# Patient Record
Sex: FEMALE | Race: BLACK OR AFRICAN AMERICAN | NOT HISPANIC OR LATINO | Employment: OTHER | URBAN - METROPOLITAN AREA
[De-identification: names, ages, dates, MRNs, and addresses within clinical notes are randomized per-mention and may not be internally consistent; named-entity substitution may affect disease eponyms.]

---

## 2017-03-26 ENCOUNTER — HOSPITAL ENCOUNTER (EMERGENCY)
Facility: CLINIC | Age: 72
Discharge: HOME | End: 2017-03-26
Attending: EMERGENCY MEDICINE | Admitting: EMERGENCY MEDICINE
Payer: MEDICARE

## 2017-03-26 VITALS
BODY MASS INDEX: 41.84 KG/M2 | OXYGEN SATURATION: 98 % | RESPIRATION RATE: 16 BRPM | SYSTOLIC BLOOD PRESSURE: 125 MMHG | WEIGHT: 251.4 LBS | TEMPERATURE: 98.1 F | DIASTOLIC BLOOD PRESSURE: 70 MMHG

## 2017-03-26 DIAGNOSIS — J11.1 FLU: ICD-10-CM

## 2017-03-26 DIAGNOSIS — J11.1 INFLUENZA-LIKE ILLNESS: ICD-10-CM

## 2017-03-26 LAB
FLUAV+FLUBV AG SPEC QL: NEGATIVE
FLUAV+FLUBV AG SPEC QL: NORMAL
SPECIMEN SOURCE: NORMAL

## 2017-03-26 PROCEDURE — 87804 INFLUENZA ASSAY W/OPTIC: CPT | Mod: 91 | Performed by: EMERGENCY MEDICINE

## 2017-03-26 PROCEDURE — 99283 EMERGENCY DEPT VISIT LOW MDM: CPT | Performed by: EMERGENCY MEDICINE

## 2017-03-26 PROCEDURE — 99283 EMERGENCY DEPT VISIT LOW MDM: CPT | Mod: Z6 | Performed by: EMERGENCY MEDICINE

## 2017-03-26 RX ORDER — OSELTAMIVIR PHOSPHATE 75 MG/1
75 CAPSULE ORAL 2 TIMES DAILY
Qty: 10 CAPSULE | Refills: 0 | Status: SHIPPED | OUTPATIENT
Start: 2017-03-26 | End: 2017-03-31

## 2017-03-26 NOTE — DISCHARGE INSTRUCTIONS
"Please make an appointment to follow up with Your Primary Care Provider as soon as possible if not improving.      Viral Syndrome (Adult)  A viral illness may cause a number of symptoms. The symptoms depend on the part of the body that the virus affects. If it settles in the nose, throat, and lungs, it may cause cough, sore throat, congestion, and sometimes headache. If it settles in the stomach and intestinal tract, it may cause vomiting and diarrhea. Sometimes it causes vague symptoms like \"aching all over,\" feeling tired, loss of appetite, or fever.  A viral illness usually lasts 1 to 2 weeks, but sometimes it lasts longer. In some cases, a more serious infection can look like a viral syndrome in the first few days of the illness. You may need another exam and additional tests to know the difference. Watch for the warning signs listed below.  Home care  Follow these guidelines for taking care of yourself at home:    If symptoms are severe, rest at home for the first 2 to 3 days.    Stay away from cigarette smoke - both your smoke and the smoke from others.    You may use over-the-counter medication acetaminophen or ibuprofen for fever, muscle aching, and headache, unless another medicine was prescribed for this. If you have chronic liver or kidney disease or ever had a stomach ulcer or GI bleeding, talk with your doctor before using these medicines . No one who is younger than 18 and ill with a fever should take aspirin. It may cause severe disease or death liver damage .    Your appetite may be poor, so a light diet is fine. Avoid dehydration by drinking 8 to 12 8-ounce glasses of fluids each day. This may include water; orange juice; lemonade; apple, grape, and cranberry juice; clear fruit drinks; electrolyte replacement and sports drinks; and decaffeinated teas and coffee. If you have been diagnosed with a kidney disease, ask your doctor how much and what types of fluids you should drink to prevent " dehydration. If you have kidney disease, drinking too much fluid can cause it build up in the your body and be dangerous to your health.    Over-the-counter remedies won't shorten the length of the illness but may be helpful for cough, sore throat; and nasal and sinus congestion. Don't use decongestants if you have high blood pressure.  Follow-up care  Follow up with your health care provider if you do not improve over the next week.  When to seek medical advice  Call your healthcare provider right away if any of these occur:    Cough with lots of colored sputum (mucus) or blood in your sputum    Chest pain, shortness of breath, wheezing, or difficulty breathing    Severe headache; face, neck, or ear pain    Severe, constant pain in the lower right side of your belly (abdominal)    Continued vomiting (can t keep liquids down)    Frequent diarrhea (more than 5 times a day); blood (red or black color) or mucus in diarrhea    Feeling weak, dizzy, or like you are going to faint    Extreme thirst    Fever of 100.4 F (38 C) or higher, or as directed by your healthcare provider  Call 911  Get emergency medical care if any of the following occur:    Convulsion    Feeling weak, dizzy, or like you are going to faint    Chest pain, shortness of breath, wheezing, or difficulty breathing    4521-7993 [employer]. [address] [place] [address], [place] [address]. All rights reserved. This information is not intended as a substitute for professional medical care. Always follow your healthcare professional's instructions.

## 2017-03-26 NOTE — ED AVS SNAPSHOT
" [facility], Emergency Department    [address]    [place] [address]    Phone:  [phone]    Fax:  [phone]                                       Shira Marsh   MRN: [id number]    Department:  [facility], Emergency Department   Date of Visit:  3/26/2017           Patient Information     Date Of Birth          1945        Your diagnoses for this visit were:     Influenza-like illness        You were seen by Henry Hughes MD.        Discharge Instructions       Please make an appointment to follow up with Your Primary Care Provider as soon as possible if not improving.      Viral Syndrome (Adult)  A viral illness may cause a number of symptoms. The symptoms depend on the part of the body that the virus affects. If it settles in the nose, throat, and lungs, it may cause cough, sore throat, congestion, and sometimes headache. If it settles in the stomach and intestinal tract, it may cause vomiting and diarrhea. Sometimes it causes vague symptoms like \"aching all over,\" feeling tired, loss of appetite, or fever.  A viral illness usually lasts 1 to 2 weeks, but sometimes it lasts longer. In some cases, a more serious infection can look like a viral syndrome in the first few days of the illness. You may need another exam and additional tests to know the difference. Watch for the warning signs listed below.  Home care  Follow these guidelines for taking care of yourself at home:    If symptoms are severe, rest at home for the first 2 to 3 days.    Stay away from cigarette smoke - both your smoke and the smoke from others.    You may use over-the-counter medication acetaminophen or ibuprofen for fever, muscle aching, and headache, unless another medicine was prescribed for this. If you have chronic liver or kidney disease or ever had a stomach ulcer or GI bleeding, talk with your doctor before using these medicines . No one who is younger than 18 and ill with a fever should take " aspirin. It may cause severe disease or death liver damage .    Your appetite may be poor, so a light diet is fine. Avoid dehydration by drinking 8 to 12 8-ounce glasses of fluids each day. This may include water; orange juice; lemonade; apple, grape, and cranberry juice; clear fruit drinks; electrolyte replacement and sports drinks; and decaffeinated teas and coffee. If you have been diagnosed with a kidney disease, ask your doctor how much and what types of fluids you should drink to prevent dehydration. If you have kidney disease, drinking too much fluid can cause it build up in the your body and be dangerous to your health.    Over-the-counter remedies won't shorten the length of the illness but may be helpful for cough, sore throat; and nasal and sinus congestion. Don't use decongestants if you have high blood pressure.  Follow-up care  Follow up with your health care provider if you do not improve over the next week.  When to seek medical advice  Call your healthcare provider right away if any of these occur:    Cough with lots of colored sputum (mucus) or blood in your sputum    Chest pain, shortness of breath, wheezing, or difficulty breathing    Severe headache; face, neck, or ear pain    Severe, constant pain in the lower right side of your belly (abdominal)    Continued vomiting (can t keep liquids down)    Frequent diarrhea (more than 5 times a day); blood (red or black color) or mucus in diarrhea    Feeling weak, dizzy, or like you are going to faint    Extreme thirst    Fever of 100.4 F (38 C) or higher, or as directed by your healthcare provider  Call 911  Get emergency medical care if any of the following occur:    Convulsion    Feeling weak, dizzy, or like you are going to faint    Chest pain, shortness of breath, wheezing, or difficulty breathing    6588-8574 [employer]. [address] [place] [address], [place] [address]. All rights reserved. This information is not intended as a substitute  for professional medical care. Always follow your healthcare professional's instructions.            24 Hour Appointment Hotline       To make an appointment at any [facility], call [phone] ([phone]). If you don't have a family doctor or clinic, we will help you find one. [facility] clinics are conveniently located to serve the needs of you and your family.             Review of your medicines      START taking        Dose / Directions Last dose taken    oseltamivir 75 MG capsule   Commonly known as:  TAMIFLU   Dose:  75 mg   Quantity:  10 capsule        Take 1 capsule (75 mg) by mouth 2 times daily for 5 days   Refills:  0          Our records show that you are taking the medicines listed below. If these are incorrect, please call your family doctor or clinic.        Dose / Directions Last dose taken    acetaminophen 325 MG tablet   Commonly known as:  TYLENOL   Dose:  650 mg   Quantity:  40 tablet        Take 2 tablets (650 mg) by mouth every 4 hours as needed for mild pain   Refills:  0        hydrochlorothiazide 12.5 MG Tabs tablet   Dose:  12.5 mg   Quantity:  14 tablet        Take 1 tablet (12.5 mg) by mouth daily   Refills:  0        prochlorperazine 10 MG tablet   Commonly known as:  COMPAZINE   Dose:  10 mg   Quantity:  10 tablet        Take 1 tablet (10 mg) by mouth every 6 hours as needed for nausea or vomiting   Refills:  0                Prescriptions were sent or printed at these locations (1 Prescription)                   Other Prescriptions                Printed at Department/Unit printer (1 of 1)         oseltamivir (TAMIFLU) 75 MG capsule                Procedures and tests performed during your visit     Influenza A/B antigen swab      Orders Needing Specimen Collection     None      Pending Results     No orders found from 3/24/2017 to 3/27/2017.            Pending Culture Results     No orders found from 3/24/2017 to 3/27/2017.            Thank you for choosing [facility]      "  Thank you for choosing [facility] for your care. Our goal is always to provide you with excellent care. Hearing back from our patients is one way we can continue to improve our services. Please take a few minutes to complete the written survey that you may receive in the mail after you visit with us. Thank you!        [employer] Information     [employer] lets you send messages to your doctor, view your test results, renew your prescriptions, schedule appointments and more. To sign up, go to [email or web address] . Click on \"Log in\" on the left side of the screen, which will take you to the Welcome page. Then click on \"Sign up Now\" on the right side of the page.     You will be asked to enter the access code listed below, as well as some personal information. Please follow the directions to create your username and password.     Your access code is: [email or web address]  Expires: 2017  5:05 AM     Your access code will  in 90 days. If you need help or a new code, please call your [facility] clinic or [phone].        Care EveryWhere ID     This is your Care EveryWhere ID. This could be used by other organizations to access your [facility] medical records  [id number]        After Visit Summary       This is your record. Keep this with you and show to your community pharmacist(s) and doctor(s) at your next visit.                  "

## 2017-03-26 NOTE — ED AVS SNAPSHOT
[facility], [place], Emergency Department    [address]    [facility] [address]    Phone:  [phone]    Fax:  [phone]                                       Shira Marsh   MRN: [id number]    Department:  [facility], Emergency Department   Date of Visit:  3/26/2017           After Visit Summary Signature Page     I have received my discharge instructions, and my questions have been answered. I have discussed any challenges I see with this plan with the nurse or doctor.    ..........................................................................................................................................  Patient/Patient Representative Signature      ..........................................................................................................................................  Patient Representative Print Name and Relationship to Patient    ..................................................               ................................................  Date                                            Time    ..........................................................................................................................................  Reviewed by Signature/Title    ...................................................              ..............................................  Date                                                            Time

## 2017-03-27 NOTE — ED PROVIDER NOTES
History     Chief Complaint   Patient presents with     Cold Symptoms     x 1 week     HPI  Shira Marsh is a 71 year old female who presents with nasal congestion for the last 2 days.  Nursing notes she was sick for the last week, but she clarifies it was for the last 2 days.  She has a sick grandson with croup.  She denies any fever or chills.  She has no pain or shortness of breath.   She denies any cough. She has no vomiting or diarrhea.  She has no recent travel.  She reports a mild headache and body aches.      PAST MEDICAL HISTORY:   Past Medical History:   Diagnosis Date     Degenerative disc disease      Depressive disorder      Enlarged heart      Hypertension        PAST SURGICAL HISTORY:   Past Surgical History:   Procedure Laterality Date     APPENDECTOMY       GYN SURGERY      Hysterectomy     GYN SURGERY      Tubal ligation       FAMILY HISTORY: No family history on file.    SOCIAL HISTORY:   Social History   Substance Use Topics     Smoking status: Never Smoker     Smokeless tobacco: Not on file     Alcohol use No       Discharge Medication List as of 3/26/2017  5:06 AM      START taking these medications    Details   oseltamivir (TAMIFLU) 75 MG capsule Take 1 capsule (75 mg) by mouth 2 times daily for 5 days, Disp-10 capsule, R-0, Local Print         CONTINUE these medications which have NOT CHANGED    Details   prochlorperazine (COMPAZINE) 10 MG tablet Take 1 tablet (10 mg) by mouth every 6 hours as needed for nausea or vomiting, Disp-10 tablet, R-0, Local Print      acetaminophen (TYLENOL) 325 MG tablet Take 2 tablets (650 mg) by mouth every 4 hours as needed for mild pain, Disp-40 tablet, R-0, Local Print      hydrochlorothiazide 12.5 MG TABS Take 1 tablet (12.5 mg) by mouth daily, Disp-14 tablet, R-0, Local Print                Allergies   Allergen Reactions     Talwin [Pentazocine Lactate]      Hallucinations         I have reviewed the Medications, Allergies, Past Medical and Surgical  History, and Social History in the Epic system.    Review of Systems   10 pt ROS completed and negative except as noted above in HPI      Physical Exam   BP: (!) 170/93  Heart Rate: 93  Temp: 98.6  F (37  C)  Resp: 20  Weight: 114 kg (251 lb 6.4 oz)  SpO2: 93 %  Physical Exam   Constitutional: She is oriented to person, place, and time. No distress.   HENT:   Head: Normocephalic and atraumatic.   Right Ear: External ear normal.   Left Ear: External ear normal.   Mouth/Throat: Oropharynx is clear and moist. No oropharyngeal exudate.   Eyes: Conjunctivae are normal. Pupils are equal, round, and reactive to light.   Neck: Normal range of motion. Neck supple.   Cardiovascular: Normal rate and intact distal pulses.    Pulmonary/Chest: Effort normal. No respiratory distress. She has no wheezes. She has no rales. She exhibits no tenderness.   Abdominal: There is no tenderness. There is no rebound and no guarding.   Musculoskeletal: Normal range of motion. She exhibits no edema.   Neurological: She is alert and oriented to person, place, and time. She exhibits normal muscle tone.   Skin: Skin is warm and dry. No rash noted. She is not diaphoretic.   Nursing note and vitals reviewed.      ED Course     ED Course     Procedures             Critical Care time:  none               Labs Ordered and Resulted from Time of ED Arrival Up to the Time of Departure from the ED   INFLUENZA A/B ANTIGEN       Assessments & Plan (with Medical Decision Making)   1.  Influenza like illness    70 yo F with congestion, headache and body aches.  Influenza negative.  Patient non-toxic appearing and no evidence of SBI.  Symptoms consistent with influenza like illness.  Will treat with a course of tamiflu and have her follow up with her primary doctor if not improving.      I have reviewed the nursing notes.    I have reviewed the findings, diagnosis, plan and need for follow up with the patient.    Discharge Medication List as of 3/26/2017  5:06  AM      START taking these medications    Details   oseltamivir (TAMIFLU) 75 MG capsule Take 1 capsule (75 mg) by mouth 2 times daily for 5 days, Disp-10 capsule, R-0, Local Print             Final diagnoses:   Influenza-like illness       3/26/2017   [facility], [place], EMERGENCY DEPARTMENT     Henry Hughes MD  03/27/17 7267

## 2020-07-12 ENCOUNTER — APPOINTMENT (OUTPATIENT)
Dept: MRI IMAGING | Facility: CLINIC | Age: 75
DRG: 305 | End: 2020-07-12
Attending: EMERGENCY MEDICINE
Payer: COMMERCIAL

## 2020-07-12 ENCOUNTER — APPOINTMENT (OUTPATIENT)
Dept: CT IMAGING | Facility: CLINIC | Age: 75
DRG: 305 | End: 2020-07-12
Attending: EMERGENCY MEDICINE
Payer: COMMERCIAL

## 2020-07-12 ENCOUNTER — HOSPITAL ENCOUNTER (INPATIENT)
Facility: CLINIC | Age: 75
LOS: 4 days | Discharge: HOME | DRG: 305 | End: 2020-07-16
Attending: EMERGENCY MEDICINE | Admitting: INTERNAL MEDICINE
Payer: COMMERCIAL

## 2020-07-12 ENCOUNTER — APPOINTMENT (OUTPATIENT)
Dept: GENERAL RADIOLOGY | Facility: CLINIC | Age: 75
DRG: 305 | End: 2020-07-12
Attending: EMERGENCY MEDICINE
Payer: COMMERCIAL

## 2020-07-12 DIAGNOSIS — G52.9 CRANIAL NERVE PALSY: ICD-10-CM

## 2020-07-12 DIAGNOSIS — I63.81 CEREBROVASCULAR ACCIDENT (CVA) DUE TO STENOSIS OF SMALL ARTERY (H): Primary | ICD-10-CM

## 2020-07-12 DIAGNOSIS — I16.1 HYPERTENSIVE EMERGENCY: ICD-10-CM

## 2020-07-12 DIAGNOSIS — M25.569 PAIN IN JOINT, LOWER LEG: ICD-10-CM

## 2020-07-12 DIAGNOSIS — I16.0 HYPERTENSIVE URGENCY: ICD-10-CM

## 2020-07-12 DIAGNOSIS — R93.0 ABNORMAL CT OF THE HEAD: ICD-10-CM

## 2020-07-12 LAB
ALBUMIN SERPL-MCNC: 3.4 G/DL (ref 3.4–5)
ALBUMIN UR-MCNC: NEGATIVE MG/DL
ALP SERPL-CCNC: 81 U/L (ref 40–150)
ALT SERPL W P-5'-P-CCNC: 12 U/L (ref 0–50)
ANION GAP SERPL CALCULATED.3IONS-SCNC: 3 MMOL/L (ref 3–14)
APPEARANCE UR: ABNORMAL
APTT PPP: 30 SEC (ref 22–37)
AST SERPL W P-5'-P-CCNC: 6 U/L (ref 0–45)
BASOPHILS # BLD AUTO: 0.1 10E9/L (ref 0–0.2)
BASOPHILS NFR BLD AUTO: 1.8 %
BILIRUB SERPL-MCNC: 0.4 MG/DL (ref 0.2–1.3)
BILIRUB UR QL STRIP: NEGATIVE
BUN SERPL-MCNC: 10 MG/DL (ref 7–30)
CALCIUM SERPL-MCNC: 8.6 MG/DL (ref 8.5–10.1)
CHLORIDE SERPL-SCNC: 110 MMOL/L (ref 94–109)
CHOLEST SERPL-MCNC: 145 MG/DL
CO2 SERPL-SCNC: 29 MMOL/L (ref 20–32)
COLOR UR AUTO: ABNORMAL
CREAT BLD-MCNC: 0.8 MG/DL (ref 0.52–1.04)
CREAT SERPL-MCNC: 0.78 MG/DL (ref 0.52–1.04)
DIFFERENTIAL METHOD BLD: ABNORMAL
EOSINOPHIL # BLD AUTO: 0 10E9/L (ref 0–0.7)
EOSINOPHIL NFR BLD AUTO: 0 %
ERYTHROCYTE [DISTWIDTH] IN BLOOD BY AUTOMATED COUNT: 14.6 % (ref 10–15)
GFR SERPL CREATININE-BSD FRML MDRD: 70 ML/MIN/{1.73_M2}
GFR SERPL CREATININE-BSD FRML MDRD: 76 ML/MIN/{1.73_M2}
GLUCOSE BLDC GLUCOMTR-MCNC: 114 MG/DL (ref 70–99)
GLUCOSE SERPL-MCNC: 106 MG/DL (ref 70–99)
GLUCOSE UR STRIP-MCNC: NEGATIVE MG/DL
HBA1C MFR BLD: 5.9 % (ref 0–5.6)
HCT VFR BLD AUTO: 39.9 % (ref 35–47)
HDLC SERPL-MCNC: 50 MG/DL
HGB BLD-MCNC: 12 G/DL (ref 11.7–15.7)
HGB UR QL STRIP: NEGATIVE
INR PPP: 1.1 (ref 0.86–1.14)
INTERPRETATION ECG - MUSE: NORMAL
KETONES UR STRIP-MCNC: NEGATIVE MG/DL
LACTATE BLD-SCNC: 0.9 MMOL/L (ref 0.7–2)
LDLC SERPL CALC-MCNC: 84 MG/DL
LEUKOCYTE ESTERASE UR QL STRIP: ABNORMAL
LIPASE SERPL-CCNC: 44 U/L (ref 73–393)
LYMPHOCYTES # BLD AUTO: 1.3 10E9/L (ref 0.8–5.3)
LYMPHOCYTES NFR BLD AUTO: 28.6 %
MCH RBC QN AUTO: 26.8 PG (ref 26.5–33)
MCHC RBC AUTO-ENTMCNC: 30.1 G/DL (ref 31.5–36.5)
MCV RBC AUTO: 89 FL (ref 78–100)
MONOCYTES # BLD AUTO: 0.1 10E9/L (ref 0–1.3)
MONOCYTES NFR BLD AUTO: 1.8 %
MUCOUS THREADS #/AREA URNS LPF: PRESENT /LPF
MYELOCYTES # BLD: 0 10E9/L
MYELOCYTES NFR BLD MANUAL: 0.9 %
NEUTROPHILS # BLD AUTO: 3.1 10E9/L (ref 1.6–8.3)
NEUTROPHILS NFR BLD AUTO: 66.9 %
NITRATE UR QL: NEGATIVE
NONHDLC SERPL-MCNC: 95 MG/DL
NT-PROBNP SERPL-MCNC: 135 PG/ML (ref 0–900)
PH UR STRIP: 8 PH (ref 5–7)
PLATELET # BLD AUTO: 121 10E9/L (ref 150–450)
PLATELET # BLD EST: ABNORMAL 10*3/UL
POTASSIUM SERPL-SCNC: 3.6 MMOL/L (ref 3.4–5.3)
PROT SERPL-MCNC: 6.9 G/DL (ref 6.8–8.8)
RADIOLOGIST FLAGS: ABNORMAL
RBC # BLD AUTO: 4.47 10E12/L (ref 3.8–5.2)
RBC #/AREA URNS AUTO: 0 /HPF (ref 0–2)
RBC MORPH BLD: NORMAL
SODIUM SERPL-SCNC: 141 MMOL/L (ref 133–144)
SOURCE: ABNORMAL
SP GR UR STRIP: 1.03 (ref 1–1.03)
SQUAMOUS #/AREA URNS AUTO: 5 /HPF (ref 0–1)
T4 FREE SERPL-MCNC: 0.94 NG/DL (ref 0.76–1.46)
TRIGL SERPL-MCNC: 51 MG/DL
TROPONIN I SERPL-MCNC: <0.015 UG/L (ref 0–0.04)
TSH SERPL DL<=0.005 MIU/L-ACNC: 4.71 MU/L (ref 0.4–4)
UROBILINOGEN UR STRIP-MCNC: NORMAL MG/DL (ref 0–2)
WBC # BLD AUTO: 4.6 10E9/L (ref 4–11)
WBC #/AREA URNS AUTO: 2 /HPF (ref 0–5)

## 2020-07-12 PROCEDURE — 85025 COMPLETE CBC W/AUTO DIFF WBC: CPT | Performed by: EMERGENCY MEDICINE

## 2020-07-12 PROCEDURE — 80061 LIPID PANEL: CPT | Performed by: EMERGENCY MEDICINE

## 2020-07-12 PROCEDURE — 36415 COLL VENOUS BLD VENIPUNCTURE: CPT | Performed by: INTERNAL MEDICINE

## 2020-07-12 PROCEDURE — 70553 MRI BRAIN STEM W/O & W/DYE: CPT

## 2020-07-12 PROCEDURE — 83880 ASSAY OF NATRIURETIC PEPTIDE: CPT | Performed by: EMERGENCY MEDICINE

## 2020-07-12 PROCEDURE — 84439 ASSAY OF FREE THYROXINE: CPT | Performed by: EMERGENCY MEDICINE

## 2020-07-12 PROCEDURE — 70498 CT ANGIOGRAPHY NECK: CPT

## 2020-07-12 PROCEDURE — 12000004 ZZH R&B IMCU UMMC

## 2020-07-12 PROCEDURE — A9585 GADOBUTROL INJECTION: HCPCS | Performed by: RADIOLOGY

## 2020-07-12 PROCEDURE — 93005 ELECTROCARDIOGRAM TRACING: CPT | Performed by: EMERGENCY MEDICINE

## 2020-07-12 PROCEDURE — 99285 EMERGENCY DEPT VISIT HI MDM: CPT | Mod: 25 | Performed by: EMERGENCY MEDICINE

## 2020-07-12 PROCEDURE — 96365 THER/PROPH/DIAG IV INF INIT: CPT | Mod: 59 | Performed by: EMERGENCY MEDICINE

## 2020-07-12 PROCEDURE — 84484 ASSAY OF TROPONIN QUANT: CPT | Performed by: EMERGENCY MEDICINE

## 2020-07-12 PROCEDURE — 00000146 ZZHCL STATISTIC GLUCOSE BY METER IP

## 2020-07-12 PROCEDURE — U0003 INFECTIOUS AGENT DETECTION BY NUCLEIC ACID (DNA OR RNA); SEVERE ACUTE RESPIRATORY SYNDROME CORONAVIRUS 2 (SARS-COV-2) (CORONAVIRUS DISEASE [COVID-19]), AMPLIFIED PROBE TECHNIQUE, MAKING USE OF HIGH THROUGHPUT TECHNOLOGIES AS DESCRIBED BY CMS-2020-01-R: HCPCS | Performed by: INTERNAL MEDICINE

## 2020-07-12 PROCEDURE — 25800030 ZZH RX IP 258 OP 636: Performed by: EMERGENCY MEDICINE

## 2020-07-12 PROCEDURE — 96376 TX/PRO/DX INJ SAME DRUG ADON: CPT | Performed by: EMERGENCY MEDICINE

## 2020-07-12 PROCEDURE — 25000132 ZZH RX MED GY IP 250 OP 250 PS 637: Mod: GY | Performed by: NURSE PRACTITIONER

## 2020-07-12 PROCEDURE — 81001 URINALYSIS AUTO W/SCOPE: CPT | Performed by: EMERGENCY MEDICINE

## 2020-07-12 PROCEDURE — 83605 ASSAY OF LACTIC ACID: CPT | Performed by: EMERGENCY MEDICINE

## 2020-07-12 PROCEDURE — 84425 ASSAY OF VITAMIN B-1: CPT | Performed by: EMERGENCY MEDICINE

## 2020-07-12 PROCEDURE — 99223 1ST HOSP IP/OBS HIGH 75: CPT | Mod: AI | Performed by: INTERNAL MEDICINE

## 2020-07-12 PROCEDURE — 25500064 ZZH RX 255 OP 636: Performed by: RADIOLOGY

## 2020-07-12 PROCEDURE — 83036 HEMOGLOBIN GLYCOSYLATED A1C: CPT | Performed by: EMERGENCY MEDICINE

## 2020-07-12 PROCEDURE — 70450 CT HEAD/BRAIN W/O DYE: CPT

## 2020-07-12 PROCEDURE — 85730 THROMBOPLASTIN TIME PARTIAL: CPT | Performed by: EMERGENCY MEDICINE

## 2020-07-12 PROCEDURE — 83690 ASSAY OF LIPASE: CPT | Performed by: EMERGENCY MEDICINE

## 2020-07-12 PROCEDURE — 82565 ASSAY OF CREATININE: CPT

## 2020-07-12 PROCEDURE — 25000132 ZZH RX MED GY IP 250 OP 250 PS 637: Performed by: EMERGENCY MEDICINE

## 2020-07-12 PROCEDURE — 71045 X-RAY EXAM CHEST 1 VIEW: CPT

## 2020-07-12 PROCEDURE — 25000128 H RX IP 250 OP 636: Performed by: EMERGENCY MEDICINE

## 2020-07-12 PROCEDURE — 25000128 H RX IP 250 OP 636: Performed by: STUDENT IN AN ORGANIZED HEALTH CARE EDUCATION/TRAINING PROGRAM

## 2020-07-12 PROCEDURE — 85610 PROTHROMBIN TIME: CPT | Performed by: EMERGENCY MEDICINE

## 2020-07-12 PROCEDURE — 25000128 H RX IP 250 OP 636: Performed by: NURSE PRACTITIONER

## 2020-07-12 PROCEDURE — 99207 ZZC APP CREDIT; MD BILLING SHARED VISIT: CPT | Performed by: NURSE PRACTITIONER

## 2020-07-12 PROCEDURE — 84425 ASSAY OF VITAMIN B-1: CPT | Performed by: INTERNAL MEDICINE

## 2020-07-12 PROCEDURE — 80053 COMPREHEN METABOLIC PANEL: CPT | Performed by: EMERGENCY MEDICINE

## 2020-07-12 PROCEDURE — 93010 ELECTROCARDIOGRAM REPORT: CPT | Mod: Z6 | Performed by: EMERGENCY MEDICINE

## 2020-07-12 PROCEDURE — 84443 ASSAY THYROID STIM HORMONE: CPT | Performed by: EMERGENCY MEDICINE

## 2020-07-12 PROCEDURE — 96375 TX/PRO/DX INJ NEW DRUG ADDON: CPT | Performed by: EMERGENCY MEDICINE

## 2020-07-12 PROCEDURE — 87086 URINE CULTURE/COLONY COUNT: CPT | Performed by: EMERGENCY MEDICINE

## 2020-07-12 RX ORDER — LABETALOL 20 MG/4 ML (5 MG/ML) INTRAVENOUS SYRINGE
10 ONCE
Status: COMPLETED | OUTPATIENT
Start: 2020-07-12 | End: 2020-07-12

## 2020-07-12 RX ORDER — LABETALOL 20 MG/4 ML (5 MG/ML) INTRAVENOUS SYRINGE
20 ONCE
Status: COMPLETED | OUTPATIENT
Start: 2020-07-12 | End: 2020-07-12

## 2020-07-12 RX ORDER — PROPARACAINE HYDROCHLORIDE 5 MG/ML
SOLUTION/ DROPS OPHTHALMIC
Status: DISCONTINUED
Start: 2020-07-12 | End: 2020-07-12 | Stop reason: HOSPADM

## 2020-07-12 RX ORDER — ONDANSETRON 4 MG/1
4 TABLET, ORALLY DISINTEGRATING ORAL EVERY 6 HOURS PRN
Status: DISCONTINUED | OUTPATIENT
Start: 2020-07-12 | End: 2020-07-16 | Stop reason: HOSPADM

## 2020-07-12 RX ORDER — NALOXONE HYDROCHLORIDE 0.4 MG/ML
.1-.4 INJECTION, SOLUTION INTRAMUSCULAR; INTRAVENOUS; SUBCUTANEOUS
Status: DISCONTINUED | OUTPATIENT
Start: 2020-07-12 | End: 2020-07-16 | Stop reason: HOSPADM

## 2020-07-12 RX ORDER — HYDRALAZINE HYDROCHLORIDE 20 MG/ML
10 INJECTION INTRAMUSCULAR; INTRAVENOUS EVERY 6 HOURS PRN
Status: DISCONTINUED | OUTPATIENT
Start: 2020-07-12 | End: 2020-07-12

## 2020-07-12 RX ORDER — BISACODYL 5 MG
15 TABLET, DELAYED RELEASE (ENTERIC COATED) ORAL DAILY PRN
Status: DISCONTINUED | OUTPATIENT
Start: 2020-07-12 | End: 2020-07-16 | Stop reason: HOSPADM

## 2020-07-12 RX ORDER — MULTIPLE VITAMINS W/ MINERALS TAB 9MG-400MCG
1 TAB ORAL ONCE
Status: COMPLETED | OUTPATIENT
Start: 2020-07-12 | End: 2020-07-12

## 2020-07-12 RX ORDER — GADOBUTROL 604.72 MG/ML
0.1 INJECTION INTRAVENOUS ONCE
Status: COMPLETED | OUTPATIENT
Start: 2020-07-12 | End: 2020-07-12

## 2020-07-12 RX ORDER — LORAZEPAM 2 MG/ML
2 INJECTION INTRAMUSCULAR ONCE
Status: DISCONTINUED | OUTPATIENT
Start: 2020-07-12 | End: 2020-07-12

## 2020-07-12 RX ORDER — LOSARTAN POTASSIUM 25 MG/1
25 TABLET ORAL DAILY
Status: DISCONTINUED | OUTPATIENT
Start: 2020-07-12 | End: 2020-07-13

## 2020-07-12 RX ORDER — BISACODYL 5 MG
5 TABLET, DELAYED RELEASE (ENTERIC COATED) ORAL DAILY PRN
Status: DISCONTINUED | OUTPATIENT
Start: 2020-07-12 | End: 2020-07-16 | Stop reason: HOSPADM

## 2020-07-12 RX ORDER — ACETAMINOPHEN 325 MG/1
650 TABLET ORAL EVERY 4 HOURS PRN
Status: DISCONTINUED | OUTPATIENT
Start: 2020-07-12 | End: 2020-07-16 | Stop reason: HOSPADM

## 2020-07-12 RX ORDER — CEFTRIAXONE 1 G/1
1 INJECTION, POWDER, FOR SOLUTION INTRAMUSCULAR; INTRAVENOUS EVERY 24 HOURS
Status: DISCONTINUED | OUTPATIENT
Start: 2020-07-12 | End: 2020-07-14

## 2020-07-12 RX ORDER — ASPIRIN 81 MG/1
81 TABLET, CHEWABLE ORAL DAILY
Status: DISCONTINUED | OUTPATIENT
Start: 2020-07-13 | End: 2020-07-16 | Stop reason: HOSPADM

## 2020-07-12 RX ORDER — IOPAMIDOL 755 MG/ML
75 INJECTION, SOLUTION INTRAVASCULAR ONCE
Status: COMPLETED | OUTPATIENT
Start: 2020-07-12 | End: 2020-07-12

## 2020-07-12 RX ORDER — ONDANSETRON 2 MG/ML
4 INJECTION INTRAMUSCULAR; INTRAVENOUS EVERY 6 HOURS PRN
Status: DISCONTINUED | OUTPATIENT
Start: 2020-07-12 | End: 2020-07-16 | Stop reason: HOSPADM

## 2020-07-12 RX ORDER — LANOLIN ALCOHOL/MO/W.PET/CERES
100 CREAM (GRAM) TOPICAL DAILY
Status: DISCONTINUED | OUTPATIENT
Start: 2020-07-20 | End: 2020-07-16

## 2020-07-12 RX ORDER — PROCHLORPERAZINE 25 MG
12.5 SUPPOSITORY, RECTAL RECTAL EVERY 12 HOURS PRN
Status: DISCONTINUED | OUTPATIENT
Start: 2020-07-12 | End: 2020-07-16 | Stop reason: HOSPADM

## 2020-07-12 RX ORDER — BISACODYL 5 MG
10 TABLET, DELAYED RELEASE (ENTERIC COATED) ORAL DAILY PRN
Status: DISCONTINUED | OUTPATIENT
Start: 2020-07-12 | End: 2020-07-16 | Stop reason: HOSPADM

## 2020-07-12 RX ORDER — TRAMADOL HYDROCHLORIDE 50 MG/1
50 TABLET ORAL EVERY 6 HOURS PRN
Status: DISCONTINUED | OUTPATIENT
Start: 2020-07-12 | End: 2020-07-16 | Stop reason: HOSPADM

## 2020-07-12 RX ORDER — PROCHLORPERAZINE MALEATE 5 MG
5 TABLET ORAL EVERY 6 HOURS PRN
Status: DISCONTINUED | OUTPATIENT
Start: 2020-07-12 | End: 2020-07-16 | Stop reason: HOSPADM

## 2020-07-12 RX ORDER — MAGNESIUM CARB/ALUMINUM HYDROX 105-160MG
148 TABLET,CHEWABLE ORAL
Status: DISCONTINUED | OUTPATIENT
Start: 2020-07-12 | End: 2020-07-16 | Stop reason: HOSPADM

## 2020-07-12 RX ORDER — LIDOCAINE 40 MG/G
CREAM TOPICAL
Status: DISCONTINUED | OUTPATIENT
Start: 2020-07-12 | End: 2020-07-16 | Stop reason: HOSPADM

## 2020-07-12 RX ADMIN — CEFTRIAXONE 1 G: 1 INJECTION, POWDER, FOR SOLUTION INTRAMUSCULAR; INTRAVENOUS at 23:00

## 2020-07-12 RX ADMIN — THIAMINE HYDROCHLORIDE 500 MG: 100 INJECTION, SOLUTION INTRAMUSCULAR; INTRAVENOUS at 16:02

## 2020-07-12 RX ADMIN — TRAMADOL HYDROCHLORIDE 50 MG: 50 TABLET, FILM COATED ORAL at 20:57

## 2020-07-12 RX ADMIN — ONDANSETRON 4 MG: 2 INJECTION INTRAMUSCULAR; INTRAVENOUS at 19:49

## 2020-07-12 RX ADMIN — LOSARTAN POTASSIUM 25 MG: 25 TABLET, FILM COATED ORAL at 18:42

## 2020-07-12 RX ADMIN — GADOBUTROL 10 ML: 604.72 INJECTION INTRAVENOUS at 12:26

## 2020-07-12 RX ADMIN — PROCHLORPERAZINE EDISYLATE 5 MG: 5 INJECTION INTRAMUSCULAR; INTRAVENOUS at 22:56

## 2020-07-12 RX ADMIN — ACETAMINOPHEN 650 MG: 325 TABLET, FILM COATED ORAL at 18:42

## 2020-07-12 RX ADMIN — LABETALOL 20 MG/4 ML (5 MG/ML) INTRAVENOUS SYRINGE 20 MG: at 16:01

## 2020-07-12 RX ADMIN — MULTIPLE VITAMINS W/ MINERALS TAB 1 TABLET: TAB at 15:11

## 2020-07-12 RX ADMIN — IOPAMIDOL 75 ML: 755 INJECTION, SOLUTION INTRAVENOUS at 11:00

## 2020-07-12 RX ADMIN — LABETALOL 20 MG/4 ML (5 MG/ML) INTRAVENOUS SYRINGE 10 MG: at 15:10

## 2020-07-12 ASSESSMENT — ENCOUNTER SYMPTOMS
RESPIRATORY NEGATIVE: 1
NAUSEA: 1
FEVER: 0
BRUISES/BLEEDS EASILY: 0
VOMITING: 1
CHILLS: 0
CARDIOVASCULAR NEGATIVE: 1

## 2020-07-12 ASSESSMENT — MIFFLIN-ST. JEOR
SCORE: 1597.88
SCORE: 1601.32

## 2020-07-12 ASSESSMENT — PAIN DESCRIPTION - DESCRIPTORS: DESCRIPTORS: ACHING

## 2020-07-12 NOTE — ED PROVIDER NOTES
ED Provider Note  [facility]      History     Chief Complaint   Patient presents with     Nausea & Vomiting     Eye Problem     HPI  Shira Marsh is a 75 year old female who presents with dizziness, nausea and vomiting and new eye problem.  Patient states that she was in her usual state of health yesterday morning.  She is uncertain exactly what time but she noted dizziness, headache, nausea. Last known well time is not clear.  She felt lightheaded like she might pass out.  This morning she began to vomit.  Emesis has been nonbloody nonbilious.  Her daughter noted that the patient had an abnormal appearance to her eyes today.  She is a history of hypertension.  She does not take any medications.  Also a past history of some form of thyroid abnormality per her chart.  No cough, no fever, no known exposure to COVID.      Past Medical History  Past Medical History:   Diagnosis Date     Degenerative disc disease      Depressive disorder      Enlarged heart      Hypertension      Past Surgical History:   Procedure Laterality Date     APPENDECTOMY       GYN SURGERY      Hysterectomy     GYN SURGERY      Tubal ligation       Allergies   Allergen Reactions     Lisinopril Cough     Talwin [Pentazocine Lactate]      Hallucinations     Past medical history, past surgical history, medications, and allergies were reviewed with the patient. Additional pertinent items: None    Family History  Family History   Problem Relation Age of Onset     Stomach Cancer Father      Family history was reviewed with the patient. Additional pertinent items: None    Social History  Social History     Tobacco Use     Smoking status: Never Smoker     Smokeless tobacco: Never Used   Substance Use Topics     Alcohol use: No     Drug use: No      Social history was reviewed with the patient. Additional pertinent items: None    Review of Systems   Constitutional: Negative for chills and fever.   Eyes:        Blurry vision  "and new CN defect   Respiratory: Negative.    Cardiovascular: Negative.    Gastrointestinal: Positive for nausea and vomiting.   Allergic/Immunologic: Negative for immunocompromised state.   Hematological: Does not bruise/bleed easily.     A complete review of systems was performed with pertinent positives and negatives noted in the HPI, and all other systems negative.    Physical Exam   BP: (!) 187/92  Pulse: 87  Heart Rate: 81  Temp: 98.1  F (36.7  C)  Resp: 18  Height: 165.1 cm (5' 5\")  Weight: 109.5 kg (241 lb 8 oz)  SpO2: 99 %     Physical Exam  Gen:A&Ox3, uncomfortable  HEENT: no facial tenderness or wounds, head atraumatic, oropharynx clear, mucous membranes moist, TMs clear bilaterally  Neck:no bony tenderness or step offs, no JVD, trachea midline  Back: no CVA tenderness, no midline bony tenderness  CV:RRR with systolic murmur  PULM:Clear to auscultation bilaterally  Abd:soft, nontender, nondistended. Bowel sounds present and normal  UE:No traumatic injuries, skin normal  LE:no traumatic injuries, skin normal, no LE edema.   Neuro: CN 3 palsy on left. Strength 5/5 throughout, gait not assessed due to symptoms, sensation intact, coordination normal on finger to nose testing.   Skin: no rashes or ecchymoses  Eyes: IOP right 13, left 16, no conjunctival abnormalities noted      ED Course      Procedures             EKG Interpretation:      Interpreted by Lilli Donohue MD  Time reviewed: 10:11AM  Symptoms at time of EKG: dizziness  Rhythm: normal sinus   Rate: 83  Axis: left  Ectopy: none  Conduction: normal  ST Segments/ T Waves: No ST-T wave changes  Q Waves: none  Comparison to prior: Unchanged from Oct. 30, 2016    Clinical Impression: NSR with left axis            The patient has stroke symptoms:         ED Stroke specific documentation           NIHSS PDF     Patient last known well time: unclear, ~24 hours ago  ED Provider first to bedside at: 10:15am  CT Results received at: 11:00    tPA:   Not " given due to unclear or unfavorable risk-benefit profile for extended window thrombolysis beyond the conventional 4.5 hour time window.    If treating with tPA: Ensure SBP<185 and DBP<105 prior to treatment with IV tPA.  Administering IV tPA after treatment with IV labetalol, hydralazine, or nicardipine is reasonable once BP control is established.    Endovascular Retrieval:  Not initiated due to absence of proximal vessel occlusion    National Institutes of Health Stroke Scale (Baseline)  Time Performed: 10:10     Score    Level of consciousness: (0)   Alert, keenly responsive    LOC questions: (0)   Answers both questions correctly    LOC commands: (0)   Performs both tasks correctly    Best gaze: (1)   Partial gaze palsy    Visual: (0)   No visual loss    Facial palsy: (0)   Normal symmetrical movements    Motor arm (left): (0)   No drift    Motor arm (right): (0)   No drift    Motor leg (left): (0)   No drift    Motor leg (right): (0)   No drift    Limb ataxia: (0)   Absent    Sensory: (0)   Normal- no sensory loss    Best language: (0)   Normal- no aphasia    Dysarthria: (0)   Normal    Extinction and inattention: (0)   No abnormality        Total Score:  1        Stroke Mimics were considered (including migraine headache, seizure disorder, hypoglycemia (or hyperglycemia), head or spinal trauma, CNS infection, Toxin ingestion and shock state (e.g. sepsis) .       Results for orders placed or performed during the hospital encounter of 07/12/20   XR Chest Port 1 View     Status: None    Narrative    Exam: XR CHEST PORT 1 VW, 7/12/2020 10:57 AM    Indication: chest pain    Comparison: Chest x-ray 7/14/2014    Findings:   Portable supine AP radiograph the chest. Stable enlargement of the  cardiac silhouette. Unchanged elevation of the right hemidiaphragm. No  pneumothorax or pleural effusion. No focal airspace opacities.  Presumed 2 cm calcified splenic artery aneurysm.      Impression    Impression:   1. Stable  cardiomegaly.  2. No focal airspace opacities.  3. Presumed calcified splenic artery aneurysm.    I have personally reviewed the examination and initial interpretation  and I agree with the findings.    PONCHO DOUGLASS MD   Head CT w/o contrast     Status: Abnormal   Result Value Ref Range    Radiologist flags Subcentimeter hyperdensity at the right anterior (AA)     Narrative    Head CT without contrast, 7/12/2020 11:01 AM    History: Presents with dizziness, nausea and vomiting and new eye  problem. Left 3rd cranial nerve palsy. No history of trauma.    Comparison: No previous study.    Technique: Using multidetector thin collimation helical acquisition  technique, axial, coronal and sagittal CT images from the skull base  to the vertex were obtained without intravenous contrast.    Findings:   Hypodensity within the left posterior parasagittal occipital lobe,  likely representing volume averaging of adjacent prominent sulcus.     6 x 6 mm hyperdensity in the right frontal cortical region, could be  intra-axial versus extra-axial, difficult to differentiate. There is  no mass effect, or midline shift. Gray/white matter differentiation in  both cerebral hemispheres is preserved. Ventricles are proportionate  to the cerebral sulci. The basal cisterns are clear. Confluent  hypoattenuation in supratentorial periventricular white matter most  compatible with moderate chronic microvascular ischemic changes.    The bony calvaria and the bones of the skull base are normal. The  visualized portions of the paranasal sinuses and mastoid air cells are  clear.       Impression    Impression:  Subcentimeter roundish hyperdensity in the right frontal cortical  region. Could be an intra-axial versus an extra-axial lesion. If it is  an intra-axial lesion could be a small focus of hemorrhage versus a  vascular malformation (cavernous malformation). MRI with contrast is  recommended to further evaluate.     [Critical Result:  Subcentimeter hyperdensity at the right anterior  frontal area, could be an hemorrhage.]    Finding was identified on 7/12/2020 10:52 AM.     Dr. Donohue was contacted by Dr. Sullivan at 7/12/2020 10:59 AM and  verbalized understanding of the critical finding.      I have personally reviewed the examination and initial interpretation  and I agree with the findings.    MIRNA ETIENNE MD   CTA Head Neck with Contrast     Status: None    Narrative    CTA  HEAD NECK WITH CONTRAST 7/12/2020 11:01 AM    CT angiogram of the neck   CT angiogram of the base of the brain with contrast  Reconstruction by the Radiologist on the 3D workstation    Provided History: Cranial nerve abnormality.    Comparison:  No previous study.      Technique:  HEAD and NECK CTA: During rapid bolus intravenous injection of  nonionic contrast material, axial images were obtained using thin  collimation multidetector helical technique from the base of the upper  aortic arch through the [place] of [place]. This CT angiogram data was  reconstructed at thin intervals with mild overlap. Images were sent to  the [place] workstation, and 3D reconstructions were obtained. The  axial source images, multiplanar reformations, 3D reconstructions in  both maximum intensity projection display and volume rendered models  were reviewed, with reconstructions performed by the technologist and  the radiologist.    Contrast: Isovue 370    Findings:    Head CTA demonstrates no aneurysm or stenosis of the major  intracranial arteries. Bilateral internal carotid arteries have  tortuous and retropharyngeal course. Atherosclerotic calcifications  within the bilateral carotid siphons. Bilateral posterior  communicating arteries patent, very thin on the right. Anterior  communicating artery is patent.    Neck CTA: The arterial structures in the neck particularly at the  level of the origin are tortuous.  The common carotid arteries and  internal carotid arteries demonstrate  retropharyngeal course, an  anatomical variation. They are patent. Vertebral arteries are patent.  Vertebral arteries are tortuous as well. No hemodynamically  significant stenosis are noted. Bilateral jugular veins which are not  opacified on this arteriogram imaging as expected but appear engorged  particularly on the right, this is of no clinical significance.       Impression    Impression:    1. Head CTA demonstrates no aneurysm or stenosis of the major  intracranial arteries.   2. Neck CTA demonstrates no stenosis of the major cervical arteries.    I have personally reviewed the examination and initial interpretation  and I agree with the findings.    MIRNA ETIENNE MD   MR Brain for Stroke [facility] w/o & w Contras     Status: None    Narrative    MRI brain without and with contrast  MRA of the head without contrast  Neck MRA without and with contrast    Provided History:  none provided.    Information obtained from EMR: Dizziness, headache and left visual  disturbances.  ICD-10:    Comparison:  CT head 7/12/2020      Technique:   Brain MRI:  Axial diffusion, FLAIR, T2-weighted, susceptibility, and  coronal T1-weighted images were obtained without intravenous contrast.  Following intravenous gadolinium-based contrast administration, axial  and coronal T1-weighted images were obtained.    Head MRA: 3D time-of-flight MRA of the [place] was performed  without intravenous contrast.  Neck MRA:  Limited non contrast 2DTOF images were obtained of the  mid-cervical region. Following intravenous gadolinium-based contrast  administration, a contrast enhanced MRA of the neck/cervical vessels  was performed.  Three-dimensional reconstructions of the neck and head MRA were  created, which were reviewed by the radiologist.    Dose: 10CC GADAVIST    Findings:   Brain MRI: Axial diffusion weighted images demonstrate no definite  acute infarct. On the FLAIR images, there are confluent and patchy  areas of T2 hyperintensity  in the periventricular and subcortical  white matter, nonspecific but likely the sequelae of chronic small  vessel ischemic disease..     Scattered foci of susceptibility effect in both cerebral hemispheres,  the basal ganglia, midbrain, david and cerebellum, likely representing  chronic microhemorrhage.     Contrast-enhanced images of the brain demonstrate 0.8 cm partly  enhancing partially enhancing ovoid lesion in the right frontal area.  The lesion is likely extra-axial and demonstrates T1 hypointensity, T2  hyperintensity (series #26, image 14, series #12, image 14).     Head MRA demonstrates no definite aneurysm or stenosis of the major  intracranial arteries.  Anterior and posterior communicating arteries are patent.  Neck MRA demonstrates patent major cervical arteries. There is  retropharyngeal course of bilateral carotid arteries, normal  anatomical variation.      Impression    Impression:  1. No acute infarction.  2. Changes of moderately advanced chronic small vessel ischemic  disease.  3. Scattered foci of susceptibility in the cerebral hemispheres, basal  ganglia, midbrain, david and cerebellum, likely representing chronic  microhemorrhages.  4. 8 mm heterogeneously enhancing right frontal dural based irregular  lesion. This is indeterminate. After evaluation of noncontrast CT  appearance, arterial (CTA) and venous enhancement (MR postgd)  patterns, and basic MR signal characteristics, the findings are not  very typical of a meningioma. A dural based metastasis in differential  though, there is no malignancy history in clinical records. Short  interval follow up can be considered in couple weeks to follow up this  finding.   5. Head MRA demonstrates no definite aneurysm or stenosis of the major  intracranial arteries.  5. Neck MRA demonstrates patent major cervical arteries.    I have personally reviewed the examination and initial interpretation  and I agree with the findings.    MIRNA ETIENNE MD   CBC  with platelets differential     Status: Abnormal   Result Value Ref Range    WBC 4.6 4.0 - 11.0 10e9/L    RBC Count 4.47 3.8 - 5.2 10e12/L    Hemoglobin 12.0 11.7 - 15.7 g/dL    Hematocrit 39.9 35.0 - 47.0 %    MCV 89 78 - 100 fl    MCH 26.8 26.5 - 33.0 pg    MCHC 30.1 (L) 31.5 - 36.5 g/dL    RDW 14.6 10.0 - 15.0 %    Platelet Count 121 (L) 150 - 450 10e9/L    Diff Method Manual Differential     % Neutrophils 66.9 %    % Lymphocytes 28.6 %    % Monocytes 1.8 %    % Eosinophils 0.0 %    % Basophils 1.8 %    % Myelocytes 0.9 %    Absolute Neutrophil 3.1 1.6 - 8.3 10e9/L    Absolute Lymphocytes 1.3 0.8 - 5.3 10e9/L    Absolute Monocytes 0.1 0.0 - 1.3 10e9/L    Absolute Eosinophils 0.0 0.0 - 0.7 10e9/L    Absolute Basophils 0.1 0.0 - 0.2 10e9/L    Absolute Myelocytes 0.0 0 10e9/L    RBC Morphology Normal     Platelet Estimate Confirming automated cell count    Comprehensive metabolic panel     Status: Abnormal   Result Value Ref Range    Sodium 141 133 - 144 mmol/L    Potassium 3.6 3.4 - 5.3 mmol/L    Chloride 110 (H) 94 - 109 mmol/L    Carbon Dioxide 29 20 - 32 mmol/L    Anion Gap 3 3 - 14 mmol/L    Glucose 106 (H) 70 - 99 mg/dL    Urea Nitrogen 10 7 - 30 mg/dL    Creatinine 0.78 0.52 - 1.04 mg/dL    GFR Estimate 76 >60 mL/min/[1.73_m2]    GFR Estimate If Black 88 >60 mL/min/[1.73_m2]    Calcium 8.6 8.5 - 10.1 mg/dL    Bilirubin Total 0.4 0.2 - 1.3 mg/dL    Albumin 3.4 3.4 - 5.0 g/dL    Protein Total 6.9 6.8 - 8.8 g/dL    Alkaline Phosphatase 81 40 - 150 U/L    ALT 12 0 - 50 U/L    AST 6 0 - 45 U/L   Lactic acid whole blood     Status: None   Result Value Ref Range    Lactic Acid 0.9 0.7 - 2.0 mmol/L   Troponin I     Status: None   Result Value Ref Range    Troponin I ES <0.015 0.000 - 0.045 ug/L   Nt probnp inpatient (BNP)     Status: None   Result Value Ref Range    N-Terminal Pro BNP Inpatient 135 0 - 900 pg/mL   UA with Microscopic reflex to Culture     Status: Abnormal    Specimen: Urine clean catch; Unspecified  Urine   Result Value Ref Range    Color Urine Light Yellow     Appearance Urine Slightly Cloudy     Glucose Urine Negative NEG^Negative mg/dL    Bilirubin Urine Negative NEG^Negative    Ketones Urine Negative NEG^Negative mg/dL    Specific Gravity Urine 1.030 1.003 - 1.035    Blood Urine Negative NEG^Negative    pH Urine 8.0 (H) 5.0 - 7.0 pH    Protein Albumin Urine Negative NEG^Negative mg/dL    Urobilinogen mg/dL Normal 0.0 - 2.0 mg/dL    Nitrite Urine Negative NEG^Negative    Leukocyte Esterase Urine Large (A) NEG^Negative    Source Unspecified Urine     WBC Urine 2 0 - 5 /HPF    RBC Urine 0 0 - 2 /HPF    Squamous Epithelial /HPF Urine 5 (H) 0 - 1 /HPF    Mucous Urine Present (A) NEG^Negative /LPF   Lipase     Status: Abnormal   Result Value Ref Range    Lipase 44 (L) 73 - 393 U/L   TSH with free T4 reflex     Status: Abnormal   Result Value Ref Range    TSH 4.71 (H) 0.40 - 4.00 mU/L   Creatinine POCT     Status: None   Result Value Ref Range    Creatinine 0.8 0.52 - 1.04 mg/dL    GFR Estimate 70 >60 mL/min/[1.73_m2]    GFR Estimate If Black 85 >60 mL/min/[1.73_m2]   Glucose by meter     Status: Abnormal   Result Value Ref Range    Glucose 114 (H) 70 - 99 mg/dL   T4 free     Status: None   Result Value Ref Range    T4 Free 0.94 0.76 - 1.46 ng/dL   INR     Status: None   Result Value Ref Range    INR 1.10 0.86 - 1.14   Partial thromboplastin time     Status: None   Result Value Ref Range    PTT 30 22 - 37 sec   EKG 12 lead     Status: None   Result Value Ref Range    Interpretation ECG Click View Image link to view waveform and result    Urine Culture Aerobic Bacterial     Status: None (Preliminary result)    Specimen: Unspecified Urine   Result Value Ref Range    Specimen Description Unspecified Urine     Special Requests Specimen received in preservative     Culture Micro PENDING      Medications   LORazepam (ATIVAN) injection 2 mg (2 mg Intravenous Not Given 7/12/20 9801)   proparacaine (ALCAINE) 0.5 %  ophthalmic solution (has no administration in time range)   acetaminophen (TYLENOL) tablet 650 mg (650 mg Oral Given 7/12/20 1842)   losartan (COZAAR) tablet 25 mg (25 mg Oral Given 7/12/20 1842)   hydrALAZINE (APRESOLINE) injection 10 mg (has no administration in time range)   iopamidol (ISOVUE-370) solution 75 mL (75 mLs Intravenous Given 7/12/20 1100)   sodium chloride (PF) 0.9% PF flush 90 mL (90 mLs Intravenous Given 7/12/20 1100)   gadobutrol (GADAVIST) injection 10.95 mL (10 mLs Intravenous Given 7/12/20 1226)   labetalol (NORMODYNE/TRANDATE) syringe 10 mg (10 mg Intravenous Given 7/12/20 1510)   thiamine (B-1) 500 mg in sodium chloride 0.9 % 50 mL intermittent infusion (0 mg Intravenous Stopped 7/12/20 1637)   multivitamin w/minerals (THERA-VIT-M) tablet 1 tablet (1 tablet Oral Given 7/12/20 1511)   labetalol (NORMODYNE/TRANDATE) syringe 20 mg (20 mg Intravenous Given 7/12/20 1601)        Assessments & Plan (with Medical Decision Making)   76 yo F presenting with headache, nausea, vomiting, lightheadedness and cranial nerve deficit of the left eye.   Arrives afebrile, hypertensive with /92.   Neurologic exam suggestive for possible new left CN 3 palsy.   Last known well time ~24 hours ago.   IV access obtained and lab testing done.     10:15 provider to bedside.   HTN, left eye CN defect  Glucose 106.  Cr 0.8  CT, CTA head & neck ordered with pt to CT at 10:22    11:00 AM  Discussed with Neuro Rad. Posterior occipital hypodensity that appears subacute or chronic, no bleed. No large vessel occlusion, full CTA being reviewed. Neurology consulted.     Labs resulting with CBC unremarkable. BMP unremarkable. Lipase 44. TSH 4.71 with normal free T4.   Troponin negative. BNP low at 135.   INR 1.1    11:08 AM  Discussed with neuro stroke team. Out of TPA window. No endovascular indication. Recommended MRI.     11:09 AM  Discussed with neuro rad right frontal hyperdensity, could be small hemorrhage.   Staff  will review.    12:40 PM  Pt in MRI, required ativan for claustrophobia    2:30PM  Reviewed MRI results with Stroke fellow and neurology resident who has staffed pt with General Neuro staff. No stroke on MRI. Has sequella of HTN and small right frontal dural lesion that could be meningioma vs. Met. Radiology recommending short term follow up imaging.     Ok to begin BP lowering for hypertensive urgency. Neurology also recommending ophthalmology consult and IV thiamine for possibility of Wernicke's as a cause of her cranial nerve abnormality. B1 level sent and treated with 500mg IV.   Given labetalol 10mg IV followed by 20mg IV with goal of gentle BP lowering. Improved to SBP of 170s.     Discussed with the IM triage hospitalist and admitted to intermediate care.        I have reviewed the nursing notes. I have reviewed the findings, diagnosis, plan and need for follow up with the patient.    New Prescriptions    No medications on file       Final diagnoses:   Hypertensive urgency   Cranial nerve palsy     --  Lilli Donohue MD [facility]  Emergency Medicine   [facility], EMERGENCY DEPARTMENT  7/12/2020     Lilli Donohue MD  07/12/20 [id number]

## 2020-07-12 NOTE — ED NOTES
Pt's  is now at bedside. He reports pt has not seen an outpatient provider in 14 years. Pt has only been to ER.

## 2020-07-12 NOTE — LETTER
Transition Communication Hand-off for Care Transitions to Next Level of Care Provider    Name: Shira Marsh  : 1945  MRN #: [id number]  Primary Care Provider: Alma Rosa Peace     Primary Clinic: [facility], [address]  [facility] [address]     Reason for Hospitalization:  Cranial nerve palsy [G52.9]  Hypertensive urgency [I16.0]  Admit Date/Time: 2020  9:52 AM  Discharge Date: 2020  Payor Source: Payor: [employer] / Plan: [employer] MEDICARE ADVANTAGE / Product Type: Medicare /     Readmission Assessment Measure (SWATHI) Risk Score/category: Low Risk         Reason for Communication Hand-off Referral: 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 She is having issues paying for her medication, bills, and insurance premiums. Was told she is over income for Medical Assistance.  She would benefit from close social work/care coordinator follow up at the clinic.     Discharge Plan: Home with outpatient PT/OT    Concern for non-adherence with plan of care:   Y/N Yes, concerned she will not adhere to medications as she has limited income  Discharge Needs Assessment:  Needs      Most Recent Value   Equipment Currently Used at Home  cane, straight, walker, rolling        Follow-up plan:    Future Appointments   Date Time Provider Department Center   2020  8:00 AM Alma Rosa Peace MD [facility]   2020  9:00 AM Tico Camacho MD [facility]       Any outstanding tests or procedures:        Referrals     Future Labs/Procedures    Physical Therapy Referral     Process Instructions:    Work Related Injury: Functional Capacity and Work Conditioning are only offered at [facility] and [facility] (service can be provided by PT or OT).    *This therapy referral will be filtered to a centralized scheduling office at [facility] and  the patient will receive a call to schedule an appointment at a [place] location most convenient for them. *    Comments:    If you have not heard from the scheduling office within 2 business days, please call [phone] for all locations, with the exception of [place], please call [phone] and [place], please call [phone].    Please be aware that coverage of these services is subject to the terms and limitations of your health insurance plan.  Call member services at your health plan with any benefit or coverage questions.      Neurology Adult Referral     Comments:    Stroke clinic in 2-3 months.            [place] Recommendations:      GARY Vance    AVS/Discharge Summary is the source of truth; this is a helpful guide for improved communication of patient story

## 2020-07-12 NOTE — ED TRIAGE NOTES
BIBA from home where she lives with family. Per EMS pt has many medical problems but has not seen a doctor in some time, she has not taken in of her prescribed medications in years.    Yesterday pt started feeling light-headed with nausea and vomiting and reports her vision was blurry and almost blacked out. Today she is unable to stand or walk without assistance, is very weak. EMS reports [place] stroke scale negative,  equal bilaterally. Family reports today her right eye started drifting. C/o intermittent chest pain x1-2 weeks. Received 4mg PO Zofran en route with good relief.

## 2020-07-12 NOTE — ED NOTES
[facility], [place]   ED Nurse to Floor Handoff     Shira Marsh is a 75 year old female who speaks English and lives with family members,  in a home  They arrived in the ED by ambulance from home    ED Chief Complaint: Nausea & Vomiting and Eye Problem    ED Dx;   Final diagnoses:   Hypertensive urgency   Cranial nerve palsy         Needed?: No    Allergies:   Allergies   Allergen Reactions     Lisinopril Cough     Talwin [Pentazocine Lactate]      Hallucinations   .  Past Medical Hx:   Past Medical History:   Diagnosis Date     Degenerative disc disease      Depressive disorder      Enlarged heart      Hypertension       Baseline Mental status: WDL  Current Mental Status changes: at basesline    Infection present or suspected this encounter: no  Sepsis suspected: No  Isolation type: No active isolations     Activity level - Baseline/Home:  Independent  Activity Level - Current:   Stand with Assist    Bariatric equipment needed?: No    In the ED these meds were given:   Medications   LORazepam (ATIVAN) injection 2 mg (2 mg Intravenous Not Given 7/12/20 1455)   proparacaine (ALCAINE) 0.5 % ophthalmic solution (has no administration in time range)   losartan (COZAAR) tablet 25 mg (has no administration in time range)   hydrALAZINE (APRESOLINE) injection 10 mg (has no administration in time range)   iopamidol (ISOVUE-370) solution 75 mL (75 mLs Intravenous Given 7/12/20 1100)   sodium chloride (PF) 0.9% PF flush 90 mL (90 mLs Intravenous Given 7/12/20 1100)   gadobutrol (GADAVIST) injection 10.95 mL (10 mLs Intravenous Given 7/12/20 1226)   labetalol (NORMODYNE/TRANDATE) syringe 10 mg (10 mg Intravenous Given 7/12/20 1510)   thiamine (B-1) 500 mg in sodium chloride 0.9 % 50 mL intermittent infusion (0 mg Intravenous Stopped 7/12/20 1637)   multivitamin w/minerals (THERA-VIT-M) tablet 1 tablet (1 tablet Oral Given 7/12/20 1511)   labetalol (NORMODYNE/TRANDATE) syringe 20 mg  (20 mg Intravenous Given 7/12/20 1601)       Drips running?  No    Home pump  No    Current LDAs  Peripheral IV 07/12/20 Right Upper forearm (Active)   Site Assessment WDL 07/12/20 0957   Line Status Saline locked 07/12/20 0957   Number of days: 0       Labs results:   Labs Ordered and Resulted from Time of ED Arrival Up to the Time of Departure from the ED   CBC WITH PLATELETS DIFFERENTIAL - Abnormal; Notable for the following components:       Result Value    MCHC 30.1 (*)     Platelet Count 121 (*)     All other components within normal limits   COMPREHENSIVE METABOLIC PANEL - Abnormal; Notable for the following components:    Chloride 110 (*)     Glucose 106 (*)     All other components within normal limits   ROUTINE UA WITH MICROSCOPIC REFLEX TO CULTURE - Abnormal; Notable for the following components:    pH Urine 8.0 (*)     Leukocyte Esterase Urine Large (*)     Squamous Epithelial /HPF Urine 5 (*)     Mucous Urine Present (*)     All other components within normal limits   LIPASE - Abnormal; Notable for the following components:    Lipase 44 (*)     All other components within normal limits   TSH WITH FREE T4 REFLEX - Abnormal; Notable for the following components:    TSH 4.71 (*)     All other components within normal limits   GLUCOSE BY METER - Abnormal; Notable for the following components:    Glucose 114 (*)     All other components within normal limits   LACTIC ACID WHOLE BLOOD   TROPONIN I   NT PROBNP INPATIENT   GLUCOSE MONITOR NURSING POCT   CREATININE POCT   T4 FREE   INR   PARTIAL THROMBOPLASTIN TIME   VITAMIN B1 PLASMA   PERIPHERAL IV CATHETER   ISTAT CREATININE NURSING POCT   URINE CULTURE AEROBIC BACTERIAL       Imaging Studies:   Recent Results (from the past 24 hour(s))   XR Chest Port 1 View    Narrative    Exam: XR CHEST PORT 1 VW, 7/12/2020 10:57 AM    Indication: chest pain    Comparison: Chest x-ray 7/14/2014    Findings:   Portable supine AP radiograph the chest. Stable enlargement of  the  cardiac silhouette. Unchanged elevation of the right hemidiaphragm. No  pneumothorax or pleural effusion. No focal airspace opacities.  Presumed 2 cm calcified splenic artery aneurysm.      Impression    Impression:   1. Stable cardiomegaly.  2. No focal airspace opacities.  3. Presumed calcified splenic artery aneurysm.    I have personally reviewed the examination and initial interpretation  and I agree with the findings.    PONCHO DOUGLASS MD   Head CT w/o contrast   Result Value    Radiologist flags Subcentimeter hyperdensity at the right anterior (AA)    Narrative    Head CT without contrast, 7/12/2020 11:01 AM    History: Presents with dizziness, nausea and vomiting and new eye  problem. Left 3rd cranial nerve palsy. No history of trauma.    Comparison: No previous study.    Technique: Using multidetector thin collimation helical acquisition  technique, axial, coronal and sagittal CT images from the skull base  to the vertex were obtained without intravenous contrast.    Findings:   Hypodensity within the left posterior parasagittal occipital lobe,  likely representing volume averaging of adjacent prominent sulcus.     6 x 6 mm hyperdensity in the right frontal cortical region, could be  intra-axial versus extra-axial, difficult to differentiate. There is  no mass effect, or midline shift. Gray/white matter differentiation in  both cerebral hemispheres is preserved. Ventricles are proportionate  to the cerebral sulci. The basal cisterns are clear. Confluent  hypoattenuation in supratentorial periventricular white matter most  compatible with moderate chronic microvascular ischemic changes.    The bony calvaria and the bones of the skull base are normal. The  visualized portions of the paranasal sinuses and mastoid air cells are  clear.       Impression    Impression:  Subcentimeter roundish hyperdensity in the right frontal cortical  region. Could be an intra-axial versus an extra-axial lesion. If it  is  an intra-axial lesion could be a small focus of hemorrhage versus a  vascular malformation (cavernous malformation). MRI with contrast is  recommended to further evaluate.     [Critical Result: Subcentimeter hyperdensity at the right anterior  frontal area, could be an hemorrhage.]    Finding was identified on 7/12/2020 10:52 AM.     Dr. Donohue was contacted by Dr. Sullivan at 7/12/2020 10:59 AM and  verbalized understanding of the critical finding.      I have personally reviewed the examination and initial interpretation  and I agree with the findings.    MIRNA ETIENNE MD   CTA Head Neck with Contrast    Narrative    CTA  HEAD NECK WITH CONTRAST 7/12/2020 11:01 AM    CT angiogram of the neck   CT angiogram of the base of the brain with contrast  Reconstruction by the Radiologist on the 3D workstation    Provided History: Cranial nerve abnormality.    Comparison:  No previous study.      Technique:  HEAD and NECK CTA: During rapid bolus intravenous injection of  nonionic contrast material, axial images were obtained using thin  collimation multidetector helical technique from the base of the upper  aortic arch through the [place] of [place]. This CT angiogram data was  reconstructed at thin intervals with mild overlap. Images were sent to  the [employer] workstation, and 3D reconstructions were obtained. The  axial source images, multiplanar reformations, 3D reconstructions in  both maximum intensity projection display and volume rendered models  were reviewed, with reconstructions performed by the technologist and  the radiologist.    Contrast: Isovue 370    Findings:    Head CTA demonstrates no aneurysm or stenosis of the major  intracranial arteries. Bilateral internal carotid arteries have  tortuous and retropharyngeal course. Atherosclerotic calcifications  within the bilateral carotid siphons. Bilateral posterior  communicating arteries patent, very thin on the right. Anterior  communicating artery is  patent.    Neck CTA: The arterial structures in the neck particularly at the  level of the origin are tortuous.  The common carotid arteries and  internal carotid arteries demonstrate retropharyngeal course, an  anatomical variation. They are patent. Vertebral arteries are patent.  Vertebral arteries are tortuous as well. No hemodynamically  significant stenosis are noted. Bilateral jugular veins which are not  opacified on this arteriogram imaging as expected but appear engorged  particularly on the right, this is of no clinical significance.       Impression    Impression:    1. Head CTA demonstrates no aneurysm or stenosis of the major  intracranial arteries.   2. Neck CTA demonstrates no stenosis of the major cervical arteries.    I have personally reviewed the examination and initial interpretation  and I agree with the findings.    MIRNA ETIENNE MD   MR Brain for Stroke Cmpl w/o & w Contras    Narrative    MRI brain without and with contrast  MRA of the head without contrast  Neck MRA without and with contrast    Provided History:  none provided.    Information obtained from EMR: Dizziness, headache and left visual  disturbances.  ICD-10:    Comparison:  CT head 7/12/2020      Technique:   Brain MRI:  Axial diffusion, FLAIR, T2-weighted, susceptibility, and  coronal T1-weighted images were obtained without intravenous contrast.  Following intravenous gadolinium-based contrast administration, axial  and coronal T1-weighted images were obtained.    Head MRA: 3D time-of-flight MRA of the [place] of [place] was performed  without intravenous contrast.  Neck MRA:  Limited non contrast 2DTOF images were obtained of the  mid-cervical region. Following intravenous gadolinium-based contrast  administration, a contrast enhanced MRA of the neck/cervical vessels  was performed.  Three-dimensional reconstructions of the neck and head MRA were  created, which were reviewed by the radiologist.    Dose: 10CC  GADAVIST    Findings:   Brain MRI: Axial diffusion weighted images demonstrate no definite  acute infarct. On the FLAIR images, there are confluent and patchy  areas of T2 hyperintensity in the periventricular and subcortical  white matter, nonspecific but likely the sequelae of chronic small  vessel ischemic disease..     Scattered foci of susceptibility effect in both cerebral hemispheres,  the basal ganglia, midbrain, david and cerebellum, likely representing  chronic microhemorrhage.     Contrast-enhanced images of the brain demonstrate 0.8 cm partly  enhancing partially enhancing ovoid lesion in the right frontal area.  The lesion is likely extra-axial and demonstrates T1 hypointensity, T2  hyperintensity (series #26, image 14, series #12, image 14).     Head MRA demonstrates no definite aneurysm or stenosis of the major  intracranial arteries.  Anterior and posterior communicating arteries are patent.  Neck MRA demonstrates patent major cervical arteries. There is  retropharyngeal course of bilateral carotid arteries, normal  anatomical variation.      Impression    Impression:  1. No acute infarction.  2. Changes of moderately advanced chronic small vessel ischemic  disease.  3. Scattered foci of susceptibility in the cerebral hemispheres, basal  ganglia, midbrain, david and cerebellum, likely representing chronic  microhemorrhages.  4. 8 mm heterogeneously enhancing right frontal dural based irregular  lesion. This is indeterminate. After evaluation of noncontrast CT  appearance, arterial (CTA) and venous enhancement (MR postgd)  patterns, and basic MR signal characteristics, the findings are not  very typical of a meningioma. A dural based metastasis in differential  though, there is no malignancy history in clinical records. Short  interval follow up can be considered in couple weeks to follow up this  finding.   5. Head MRA demonstrates no definite aneurysm or stenosis of the major  intracranial  "arteries.  5. Neck MRA demonstrates patent major cervical arteries.    I have personally reviewed the examination and initial interpretation  and I agree with the findings.    MIRNA ETIENNE MD       Recent vital signs:   BP (!) 169/110   Pulse 82   Temp 98.1  F (36.7  C) (Oral)   Resp 18   Ht 1.651 m (5' 5\")   Wt 109.5 kg (241 lb 8 oz)   SpO2 96%   BMI 40.19 kg/m      [place] Coma Scale Score: 15 (07/12/20 1002)       Cardiac Rhythm: Normal Sinus  Pt needs tele? Yes  Skin/wound Issues: None    Code Status: Full Code    Pain control: fair    Nausea control: fair    Abnormal labs/tests/findings requiring intervention: see results review    Family present during ED course? Yes   Family Comments/Social Situation comments: n/a    Tasks needing completion: None    Kiley Nieves RN  [phone] ED      "

## 2020-07-12 NOTE — ED NOTES
Bed: ED27  Expected date:   Expected time:   Means of arrival:   Comments:  HEMS 443  75Y F  Dizziness, N/V, weakness  YELLOW

## 2020-07-12 NOTE — PHARMACY-ADMISSION MEDICATION HISTORY
Admission medication history interview status for the 7/12/2020 admission is complete. See Epic admission navigator for allergy information, pharmacy, prior to admission medications and immunization status.     Medication history interview sources:   Patient    Changes made to PTA medication list (reason)  Deleted:   -Apap, compazine, hctz    Additional medication history information (including reliability of information, actions taken by pharmacist):  -Patient reports not taking any meds recently and fills normally at the [facility]       Prior to Admission medications    Not on File         Medication history completed by: Cade Littlejohn Pharm.D.

## 2020-07-12 NOTE — CONSULTS
[facility]  Neurology  Consultation    Patient Name:  Shira Marsh  MRN:  [id number]    :  1945  Date of Service:  2020  Primary care provider:  Julio C Lopes      Neurology consultation service was asked to see Shira Marsh by Dr. Donohue to evaluate abnormal eye findings and headache.    History of Present Illness:   75 year old female h/o uncontrolled hypertension presented to the ED with 1 day of lightheadedness, dizziness, nausea, vomiting, headache, and was found to have abnormal eye movement.  In the ED her blood pressure was 210s.  CT head and MRI were unremarkable.  Patient had several visits to the ED in the past with hypertension nausea vomiting and dizziness.  She describes frontal headache about 8/10 in severity, its not positional started yesterday.  She does not have history of migraine.  She knows that whenever her blood pressure goes up she becomes to have headache and nausea and vomiting.  Yesterday she started to have nausea.  This morning she has been vomiting frequently.  She does not see double however she was told that her eyes look weird.  She denies any numbness tingling or weakness in her limbs.  Patient was seen at [facility] and her eye findings were not reported in the past.  She does not use drugs she does not use alcohol she does not smoke marijuana.  She was not recently sick or ill.  There is a CT scan reported on 2004 at [facility] and it is noticeable that her right eye looks out which is consistent with my current findings however there is no formal neurological exam on her chart..    ROS  A 10-point ROS was performed as per HPI. Pertinent negatives.    PMH  Past Medical History:   Diagnosis Date     Degenerative disc disease      Depressive disorder      Enlarged heart      Hypertension      Past Surgical History:   Procedure Laterality Date     APPENDECTOMY       GYN SURGERY      Hysterectomy     GYN  "SURGERY      Tubal ligation       Medications   (Not in a hospital admission)      Allergies  Allergies   Allergen Reactions     Talwin [Pentazocine Lactate]      Hallucinations       Social History  I have reviewed this patient's social history    Family History    This patient has no significant family history      Physical Examination   Vitals: BP (!) 207/103   Pulse 92   Temp 98.1  F (36.7  C) (Oral)   Resp 18   Ht 1.651 m (5' 5\")   Wt 109.5 kg (241 lb 8 oz)   SpO2 98%   BMI 40.19 kg/m    General: Adult, in NAD, cooperative  HEENT: NC/AT, no scleral icterus, op pink and moist.  Cardiac: RRR no M. Radial and pedal pulses present.  Chest: CTAB no w/c/r  Abdomen: S/NT/ND  Extremities: No LE swelling.  Skin: No rash or lesion.   Psych: Mood pleasant, affect congruent  Neuro:  Mental status: Awake, alert, attentive, oriented. Fund of knowledge is sufficient. Recent and remote memory appear intact. Speech is fluent, comprehension and repetition intact. No dysarthria.  Cranial nerves: Limited left eye adduction.  Right eye looks out and down.  On horizontal gaze to the right she develops right nystagmus.  Motor: Tone normal. 5/5 strength proximal and distal muscles in all 4 extremities. No pronator drift. No atrophy. No abnormal movements.  Reflexes: 3/4 and symmetric AT biceps, brachioradialis, patellar, and achilles. No ankle clonus. Toes down-going.  Sensory: Intact to LT, PP  Coordination: FNF no dysmetria, HTS & DANIEL normal      Investigations   MRI  1. No acute infarction.  2. Changes of moderately advanced chronic small vessel ischemic  disease.  3. Scattered foci of susceptibility in the cerebral hemispheres, basal  ganglia, midbrain, david and cerebellum, likely representing chronic  microhemorrhages.  4. 8 mm heterogeneously enhancing right frontal dural based irregular  lesion. This is indeterminate. After evaluation of noncontrast CT  appearance, arterial (CTA) and venous enhancement (MR " postgd)  patterns, and basic MR signal characteristics, the findings are not  very typical of a meningioma. A dural based metastasis in differential  though, there is no malignancy history in clinical records. Short  interval follow up can be considered in couple weeks to follow up this  finding.   5. Head MRA demonstrates no definite aneurysm or stenosis of the major  intracranial arteries.  5. Neck MRA demonstrates patent major cervical arteries.    Impression  Impression.  Hypertensive emergency//urgency.  After the per review of her chart at [facility], sound that her eye problems were seen on CT scan that was document in 2004 however we do not have a full neurological examination.  Her left eye abduction is limited so I am assuming she has left 3rd nerve palsy, talk to prove this is acute versus chronic given the fact that were lacking previous neuro exam.  Could be explained by hypertensive emergency urgency however could be acute minute stroke secondary to uncontrolled risk factors like diabetes or hypertension.  Regarding the 4.5 mm on her right frontal dural lesion, ultrasound this was also documented on her CT scan 2004, no acute work-up would be indicated, needs to be followed as an outpatient with close imaging.    Recommendations  -Treat hypertension emergency/urgency  -do thiamine level and start thiamine  -Stroke work-up including hemoglobin A1c, lipid panel  -If it is okay with the primary team she needs to be on aspirin  -Ophthalmological evaluation    Thank you for involving neurology in the care of Shira Marsh.  Please do not hesitate to call with questions/concerns (consult pager [phone]).      Patient was seen and discussed with Dr. Little.    Vernon Agustin, [facility]  P[facility] neurology

## 2020-07-12 NOTE — H&P
[facility], [place]    History and Physical - Hospitalist Service, [facility]       Date of Admission:  7/12/2020    Assessment & Plan   Shira Marsh is a 75 year old woman admitted on 7/12/2020. She has a history of hypertension and presents with nausea, vomiting, dizziness and headache in the setting of hypertensive emergency.    1) Hypertensive Emergency - Patient presents with nausea, vomiting, dizziness and near syncope as well as headache in the setting of hypertensive emergency.  She has previously been on Cozaar, HCTZ and metoprolol but stopped all these medications a long time ago due to side effects.  - Initial systolic blood pressures were in the 210s, so goal systolics overnight would be roughly 160.  Prefer not to overcorrect given presumed chronicity of hypertension.  -We will restart home Cozaar.  HCTZ and metoprolol both had side effects that kept the patient from continuing these medications so will attempt to manage with a single agent.  - Initial head CT findings were concerning for potential intracranial hemorrhage, but MRI found no evidence for acute infarction, chronic microhemorrhages and an 8 mm heterogenous right frontal dural lesion of unclear significance which was present in a 2004 CT scan per neurology.  Neurology has been consulted, appreciate involvement and recommended thiamine, ASA and lipids.  - Patient has no PCP provider and will need a referral for ongoing hypertension management    2) Amblyopia - Newly developed as of yesterday per the patient.  Neurology has already been consulted and has recommended ophthalmology consultation  -Consult ophthalmology    3) Suspected urinary tract infection - The patient's urine is significant for leukocyte esterase but also squamous epithelium.  Patient does report a foul odor in her urine, so we will plan to treat as a UTI.  - Start ceftriaxone    Diet: Regular  DVT Prophylaxis: Pneumatic Compression  Devices  Arias Catheter: not present  Code Status: Full         Disposition Plan   Expected discharge: 2 - 3 days, recommended to prior living arrangement once hypertension.  Entered: BALA Rose CNP 07/12/2020, 4:44 PM     The patient's care was discussed with the Attending Physician, Dr. Sutton.    BALA Rose CNP  [facility], [place]  Pager: [phone]  Please see sticky note for cross cover information  ______________________________________________________________________    Chief Complaint   N/V, dizziness, near syncope, headache    History is obtained from the patient    History of Present Illness   Shira Marsh is a 75 year old woman admitted on 7/12/2020. She has a history of hypertension and presents with nausea, vomiting, dizziness and headache in the setting of hypertensive emergency.    The patient reports that she was in her usual state of health yesterday morning when she developed nausea, vomiting and near syncope.  She also developed a persistent headache.  These are symptoms she normally associates with a high blood pressure.  She notes that she has been on several medications in the past including hydrochlorothiazide, Cozaar and metoprolol but no longer takes any of them secondary to side effects.    She has also had recurrent chest pain over the past few months.  This is always nonexertional.    She is willing to consider going back on blood pressure medications but would like to have side effects minimized.  She reports that the medication she took in the past made her go to the bathroom frequently and made her feel generally awful.    She also notes a foul odor in her urine.    Review of Systems    The 10 point Review of Systems is negative other than noted in the HPI or here.     Past Medical History    I have reviewed this patient's medical history and updated it with pertinent information if needed.   Past Medical History:   Diagnosis  Date     Degenerative disc disease      Depressive disorder      Enlarged heart      Hypertension        Past Surgical History   I have reviewed this patient's surgical history and updated it with pertinent information if needed.  Past Surgical History:   Procedure Laterality Date     APPENDECTOMY       GYN SURGERY      Hysterectomy     GYN SURGERY      Tubal ligation       Social History   Non-smoker, non-drinker.  No illicit drugs.      Family History   I have reviewed this patient's family history and updated it with pertinent information if needed.  Family History   Problem Relation Age of Onset     Stomach Cancer Father          Prior to Admission Medications   None     Allergies   Allergies   Allergen Reactions     Talwin [Pentazocine Lactate]      Hallucinations       Physical Exam   Vital Signs: Temp: 98.1  F (36.7  C) Temp src: Oral BP: (!) 173/88 Pulse: 67 Heart Rate: 79 Resp: 18 SpO2: 100 % O2 Device: None (Room air)    Weight: 241 lbs 8 oz    Physical Exam   Constitutional:   Well nourished, well developed, resting comfortably   Head: Normocephalic and atraumatic.   Eyes: Amblyopia  Neck:   No adenopathy, no bony tenderness  Cardiovascular: Regular rate and rhythm without murmurs or gallops  Pulmonary/Chest: Clear to auscultation bilaterally, with no wheezes or retractions. No respiratory distress.  GI: Soft with good bowel sounds.  Non-tender, non-distended, with no guarding, no rebound, no peritoneal signs.   Back:  No bony or CVA tenderness   Musculoskeletal:  No edema or clubbing   Skin: Skin is warm and dry. No rash noted.   Neurological: Alert and oriented to person, place, and time. Nonfocal exam  Psychiatric:  Normal mood and affect.      Data   Data reviewed today: I reviewed all medications, new labs and imaging results over the last 24 hours. I personally reviewed her labs and imaging from today.

## 2020-07-13 LAB
ANION GAP SERPL CALCULATED.3IONS-SCNC: 4 MMOL/L (ref 3–14)
BACTERIA SPEC CULT: NORMAL
BUN SERPL-MCNC: 7 MG/DL (ref 7–30)
CALCIUM SERPL-MCNC: 8.2 MG/DL (ref 8.5–10.1)
CHLORIDE SERPL-SCNC: 111 MMOL/L (ref 94–109)
CO2 SERPL-SCNC: 26 MMOL/L (ref 20–32)
CREAT SERPL-MCNC: 0.78 MG/DL (ref 0.52–1.04)
ERYTHROCYTE [DISTWIDTH] IN BLOOD BY AUTOMATED COUNT: 14.9 % (ref 10–15)
GFR SERPL CREATININE-BSD FRML MDRD: 76 ML/MIN/{1.73_M2}
GLUCOSE SERPL-MCNC: 92 MG/DL (ref 70–99)
HCT VFR BLD AUTO: 37.7 % (ref 35–47)
HGB BLD-MCNC: 11.1 G/DL (ref 11.7–15.7)
Lab: NORMAL
MCH RBC QN AUTO: 26.6 PG (ref 26.5–33)
MCHC RBC AUTO-ENTMCNC: 29.4 G/DL (ref 31.5–36.5)
MCV RBC AUTO: 90 FL (ref 78–100)
PLATELET # BLD AUTO: 104 10E9/L (ref 150–450)
POTASSIUM SERPL-SCNC: 3.6 MMOL/L (ref 3.4–5.3)
RBC # BLD AUTO: 4.18 10E12/L (ref 3.8–5.2)
SARS-COV-2 RNA SPEC QL NAA+PROBE: NOT DETECTED
SODIUM SERPL-SCNC: 141 MMOL/L (ref 133–144)
SPECIMEN SOURCE: NORMAL
SPECIMEN SOURCE: NORMAL
WBC # BLD AUTO: 5.1 10E9/L (ref 4–11)

## 2020-07-13 PROCEDURE — 25000132 ZZH RX MED GY IP 250 OP 250 PS 637: Mod: GY | Performed by: NURSE PRACTITIONER

## 2020-07-13 PROCEDURE — 25000132 ZZH RX MED GY IP 250 OP 250 PS 637: Performed by: PHYSICIAN ASSISTANT

## 2020-07-13 PROCEDURE — 80048 BASIC METABOLIC PNL TOTAL CA: CPT | Performed by: INTERNAL MEDICINE

## 2020-07-13 PROCEDURE — 25000128 H RX IP 250 OP 636: Performed by: NURSE PRACTITIONER

## 2020-07-13 PROCEDURE — 40000556 ZZH STATISTIC PERIPHERAL IV START W US GUIDANCE

## 2020-07-13 PROCEDURE — 25000132 ZZH RX MED GY IP 250 OP 250 PS 637: Performed by: STUDENT IN AN ORGANIZED HEALTH CARE EDUCATION/TRAINING PROGRAM

## 2020-07-13 PROCEDURE — 25800030 ZZH RX IP 258 OP 636: Performed by: NURSE PRACTITIONER

## 2020-07-13 PROCEDURE — 12000026 ZZH R&B TRANSPLANT

## 2020-07-13 PROCEDURE — 25000132 ZZH RX MED GY IP 250 OP 250 PS 637: Performed by: INTERNAL MEDICINE

## 2020-07-13 PROCEDURE — 85027 COMPLETE CBC AUTOMATED: CPT | Performed by: INTERNAL MEDICINE

## 2020-07-13 PROCEDURE — 99233 SBSQ HOSP IP/OBS HIGH 50: CPT | Performed by: INTERNAL MEDICINE

## 2020-07-13 PROCEDURE — 36415 COLL VENOUS BLD VENIPUNCTURE: CPT | Performed by: INTERNAL MEDICINE

## 2020-07-13 RX ORDER — AMLODIPINE BESYLATE 10 MG/1
10 TABLET ORAL DAILY
Status: DISCONTINUED | OUTPATIENT
Start: 2020-07-13 | End: 2020-07-16 | Stop reason: HOSPADM

## 2020-07-13 RX ORDER — CLOPIDOGREL BISULFATE 75 MG/1
75 TABLET ORAL DAILY
Status: DISCONTINUED | OUTPATIENT
Start: 2020-07-13 | End: 2020-07-16 | Stop reason: HOSPADM

## 2020-07-13 RX ORDER — DIPHENHYDRAMINE HCL 25 MG
25 CAPSULE ORAL 2 TIMES DAILY PRN
Status: DISCONTINUED | OUTPATIENT
Start: 2020-07-13 | End: 2020-07-16 | Stop reason: HOSPADM

## 2020-07-13 RX ORDER — LOSARTAN POTASSIUM 25 MG/1
50 TABLET ORAL DAILY
Status: DISCONTINUED | OUTPATIENT
Start: 2020-07-14 | End: 2020-07-16 | Stop reason: HOSPADM

## 2020-07-13 RX ORDER — FLUTICASONE PROPIONATE 50 MCG
2 SPRAY, SUSPENSION (ML) NASAL DAILY
Status: DISCONTINUED | OUTPATIENT
Start: 2020-07-13 | End: 2020-07-16 | Stop reason: HOSPADM

## 2020-07-13 RX ORDER — ATORVASTATIN CALCIUM 40 MG/1
40 TABLET, FILM COATED ORAL EVERY EVENING
Status: DISCONTINUED | OUTPATIENT
Start: 2020-07-13 | End: 2020-07-16 | Stop reason: HOSPADM

## 2020-07-13 RX ADMIN — THIAMINE HYDROCHLORIDE 500 MG: 100 INJECTION, SOLUTION INTRAMUSCULAR; INTRAVENOUS at 14:33

## 2020-07-13 RX ADMIN — LOSARTAN POTASSIUM 25 MG: 25 TABLET, FILM COATED ORAL at 08:27

## 2020-07-13 RX ADMIN — THIAMINE HYDROCHLORIDE 500 MG: 100 INJECTION, SOLUTION INTRAMUSCULAR; INTRAVENOUS at 20:42

## 2020-07-13 RX ADMIN — ASPIRIN 81 MG CHEWABLE TABLET 81 MG: 81 TABLET CHEWABLE at 08:27

## 2020-07-13 RX ADMIN — AMLODIPINE BESYLATE 10 MG: 10 TABLET ORAL at 11:41

## 2020-07-13 RX ADMIN — CLOPIDOGREL BISULFATE 75 MG: 75 TABLET ORAL at 14:47

## 2020-07-13 RX ADMIN — DIPHENHYDRAMINE HYDROCHLORIDE 25 MG: 25 CAPSULE ORAL at 23:03

## 2020-07-13 RX ADMIN — ATORVASTATIN CALCIUM 40 MG: 40 TABLET, FILM COATED ORAL at 23:03

## 2020-07-13 RX ADMIN — THIAMINE HYDROCHLORIDE 500 MG: 100 INJECTION, SOLUTION INTRAMUSCULAR; INTRAVENOUS at 08:28

## 2020-07-13 RX ADMIN — CEFTRIAXONE 1 G: 1 INJECTION, POWDER, FOR SOLUTION INTRAMUSCULAR; INTRAVENOUS at 23:07

## 2020-07-13 ASSESSMENT — ACTIVITIES OF DAILY LIVING (ADL)
ADLS_ACUITY_SCORE: 13
ADLS_ACUITY_SCORE: 15
ADLS_ACUITY_SCORE: 15
ADLS_ACUITY_SCORE: 13
ADLS_ACUITY_SCORE: 15
ADLS_ACUITY_SCORE: 15

## 2020-07-13 ASSESSMENT — PAIN DESCRIPTION - DESCRIPTORS
DESCRIPTORS: ACHING
DESCRIPTORS: CRAMPING
DESCRIPTORS: ACHING

## 2020-07-13 ASSESSMENT — MIFFLIN-ST. JEOR: SCORE: 1599.88

## 2020-07-13 NOTE — PROGRESS NOTES
[facility], [facility] Progress Note - Hospitalist Service, Gold 8       Date of Admission:  7/12/2020  Assessment & Plan   Shira Marsh is a 75 year old woman with hypertension who stopped taking her medications years ago, mostly due to cost, who was admitted with nausea, vomiting, HA and vision changes and diagnosed with hypertensive emergency. Initial systolic blood pressures were in the 210s, so goal systolics overnight was be roughly 160.      1) Hypertensive Emergency -  This AM BP improved at 147/78.   Headache improved, but not resolved and still notes decreased visual acuity, but does not c/o double vision.  She was previously on Cozaar, HCTZ and metoprolol.  Last night received labatolol in ED and losartan 25 mg.  BP okay now, will increase losartan to 50 mg in am.   - This morning BP is 147/76.  Will keep on losartan 25 mg daily today and increase to 50mg tomorrow.   - Patient has no PCP provider and will need a referral for ongoing hypertension management     2) Stroke - Neurology feels her eye findings are c/w  Intranuclear ophthalmoplegia and likely represent a brainstem stroke that is not seen on MRI (MRI found no evidence for acute infarction, chronic microhemorrhages and an 8 mm heterogenous right frontal dural lesion of unclear significance which was present in a 2004 CT scan per neurology)  - ophthalmology consultation for recs regarding any vision therapy or patching  - plavix and aspirin  - neurology follow up as outpatient     3) Suspected urinary tract infection - The patient's urine is significant for leukocyte esterase.  Urine cx pending.  - continue ceftriaxone pending culture       Diet: Combination Diet Regular Diet Adult    DVT Prophylaxis: Pneumatic Compression Devices  Arias Catheter: not present  Code Status: Full Code           Disposition Plan   Expected discharge: 2 - 3 days, recommended to prior living arrangement once BP  controlled.  Entered: Chase Hercules MD 07/13/2020, 10:40 AM       The patient's care was discussed with the Patient and Patient's Family.    Chase Hercules MD  Hospitalist Service, [address], [place]  Pager: [phone]  Please see sticky note for cross cover information  ______________________________________________________________________    Interval History   Still c/o some trouble with vision including decreased acuity and some double vision.  Headache improved but not resolved.    Data reviewed today: I reviewed all medications, new labs and imaging results over the last 24 hours. I personally reviewed the brain MRI image(s) showing lesion.and no stroke    Physical Exam   Vital Signs: Temp: 98.3  F (36.8  C) Temp src: Oral BP: (!) 147/76 Pulse: 80 Heart Rate: 83 Resp: 16 SpO2: 97 % O2 Device: None (Room air) Oxygen Delivery: 2 LPM  Weight: 241 lbs 2.93 oz  WDWN NAD  Lung CTAB  Heart RRR without M  Abd+BS NT ND  Ext No Edema  [place] A&0X4 Motor 5/5 all ext, normal sensation, Leftward deviation left eye, unable to cross midline to r with rightward gaze    Data   Recent Labs   Lab 07/13/20  0858 07/12/20  1015   WBC 5.1 4.6   HGB 11.1* 12.0   MCV 90 89   * 121*   INR  --  1.10    141   POTASSIUM 3.6 3.6   CHLORIDE 111* 110*   CO2 26 29   BUN 7 10   CR 0.78 0.78   ANIONGAP 4 3   MARTHA 8.2* 8.6   GLC 92 106*   ALBUMIN  --  3.4   PROTTOTAL  --  6.9   BILITOTAL  --  0.4   ALKPHOS  --  81   ALT  --  12   AST  --  6   LIPASE  --  44*   TROPI  --  <0.015     Recent Results (from the past 24 hour(s))   XR Chest Port 1 View    Narrative    Exam: XR CHEST PORT 1 VW, 7/12/2020 10:57 AM    Indication: chest pain    Comparison: Chest x-ray 7/14/2014    Findings:   Portable supine AP radiograph the chest. Stable enlargement of the  cardiac silhouette. Unchanged elevation of the right hemidiaphragm. No  pneumothorax or pleural effusion. No focal airspace opacities.  Presumed 2  cm calcified splenic artery aneurysm.      Impression    Impression:   1. Stable cardiomegaly.  2. No focal airspace opacities.  3. Presumed calcified splenic artery aneurysm.    I have personally reviewed the examination and initial interpretation  and I agree with the findings.    PONCHO DOUGLASS MD   Head CT w/o contrast   Result Value    Radiologist flags Subcentimeter hyperdensity at the right anterior (AA)    Narrative    Head CT without contrast, 7/12/2020 11:01 AM    History: Presents with dizziness, nausea and vomiting and new eye  problem. Left 3rd cranial nerve palsy. No history of trauma.    Comparison: No previous study.    Technique: Using multidetector thin collimation helical acquisition  technique, axial, coronal and sagittal CT images from the skull base  to the vertex were obtained without intravenous contrast.    Findings:   Hypodensity within the left posterior parasagittal occipital lobe,  likely representing volume averaging of adjacent prominent sulcus.     6 x 6 mm hyperdensity in the right frontal cortical region, could be  intra-axial versus extra-axial, difficult to differentiate. There is  no mass effect, or midline shift. Gray/white matter differentiation in  both cerebral hemispheres is preserved. Ventricles are proportionate  to the cerebral sulci. The basal cisterns are clear. Confluent  hypoattenuation in supratentorial periventricular white matter most  compatible with moderate chronic microvascular ischemic changes.    The bony calvaria and the bones of the skull base are normal. The  visualized portions of the paranasal sinuses and mastoid air cells are  clear.       Impression    Impression:  Subcentimeter roundish hyperdensity in the right frontal cortical  region. Could be an intra-axial versus an extra-axial lesion. If it is  an intra-axial lesion could be a small focus of hemorrhage versus a  vascular malformation (cavernous malformation). MRI with contrast is  recommended to  further evaluate.     [Critical Result: Subcentimeter hyperdensity at the right anterior  frontal area, could be an hemorrhage.]    Finding was identified on 7/12/2020 10:52 AM.     Dr. Donohue was contacted by Dr. Sullivan at 7/12/2020 10:59 AM and  verbalized understanding of the critical finding.      I have personally reviewed the examination and initial interpretation  and I agree with the findings.    MIRNA ETIENNE MD   CTA Head Neck with Contrast    Narrative    CTA  HEAD NECK WITH CONTRAST 7/12/2020 11:01 AM    CT angiogram of the neck   CT angiogram of the base of the brain with contrast  Reconstruction by the Radiologist on the 3D workstation    Provided History: Cranial nerve abnormality.    Comparison:  No previous study.      Technique:  HEAD and NECK CTA: During rapid bolus intravenous injection of  nonionic contrast material, axial images were obtained using thin  collimation multidetector helical technique from the base of the upper  aortic arch through the [place]. This CT angiogram data was  reconstructed at thin intervals with mild overlap. Images were sent to  the [employer] workstation, and 3D reconstructions were obtained. The  axial source images, multiplanar reformations, 3D reconstructions in  both maximum intensity projection display and volume rendered models  were reviewed, with reconstructions performed by the technologist and  the radiologist.    Contrast: Isovue 370    Findings:    Head CTA demonstrates no aneurysm or stenosis of the major  intracranial arteries. Bilateral internal carotid arteries have  tortuous and retropharyngeal course. Atherosclerotic calcifications  within the bilateral carotid siphons. Bilateral posterior  communicating arteries patent, very thin on the right. Anterior  communicating artery is patent.    Neck CTA: The arterial structures in the neck particularly at the  level of the origin are tortuous.  The common carotid arteries and  internal carotid arteries  demonstrate retropharyngeal course, an  anatomical variation. They are patent. Vertebral arteries are patent.  Vertebral arteries are tortuous as well. No hemodynamically  significant stenosis are noted. Bilateral jugular veins which are not  opacified on this arteriogram imaging as expected but appear engorged  particularly on the right, this is of no clinical significance.       Impression    Impression:    1. Head CTA demonstrates no aneurysm or stenosis of the major  intracranial arteries.   2. Neck CTA demonstrates no stenosis of the major cervical arteries.    I have personally reviewed the examination and initial interpretation  and I agree with the findings.    MIRNA ETIENNE MD   MR Brain for Stroke [facility] w/o & w Contras    Narrative    MRI brain without and with contrast  MRA of the head without contrast  Neck MRA without and with contrast    Provided History:  none provided.    Information obtained from EMR: Dizziness, headache and left visual  disturbances.  ICD-10:    Comparison:  CT head 7/12/2020      Technique:   Brain MRI:  Axial diffusion, FLAIR, T2-weighted, susceptibility, and  coronal T1-weighted images were obtained without intravenous contrast.  Following intravenous gadolinium-based contrast administration, axial  and coronal T1-weighted images were obtained.    Head MRA: 3D time-of-flight MRA of the [place] was performed  without intravenous contrast.  Neck MRA:  Limited non contrast 2DTOF images were obtained of the  mid-cervical region. Following intravenous gadolinium-based contrast  administration, a contrast enhanced MRA of the neck/cervical vessels  was performed.  Three-dimensional reconstructions of the neck and head MRA were  created, which were reviewed by the radiologist.    Dose: 10CC GADAVIST    Findings:   Brain MRI: Axial diffusion weighted images demonstrate no definite  acute infarct. On the FLAIR images, there are confluent and patchy  areas of T2 hyperintensity in  the periventricular and subcortical  white matter, nonspecific but likely the sequelae of chronic small  vessel ischemic disease..     Scattered foci of susceptibility effect in both cerebral hemispheres,  the basal ganglia, midbrain, david and cerebellum, likely representing  chronic microhemorrhage.     Contrast-enhanced images of the brain demonstrate 0.8 cm partly  enhancing partially enhancing ovoid lesion in the right frontal area.  The lesion is likely extra-axial and demonstrates T1 hypointensity, T2  hyperintensity (series #26, image 14, series #12, image 14).     Head MRA demonstrates no definite aneurysm or stenosis of the major  intracranial arteries.  Anterior and posterior communicating arteries are patent.  Neck MRA demonstrates patent major cervical arteries. There is  retropharyngeal course of bilateral carotid arteries, normal  anatomical variation.      Impression    Impression:  1. No acute infarction.  2. Changes of moderately advanced chronic small vessel ischemic  disease.  3. Scattered foci of susceptibility in the cerebral hemispheres, basal  ganglia, midbrain, david and cerebellum, likely representing chronic  microhemorrhages.  4. 8 mm heterogeneously enhancing right frontal dural based irregular  lesion. This is indeterminate. After evaluation of noncontrast CT  appearance, arterial (CTA) and venous enhancement (MR postgd)  patterns, and basic MR signal characteristics, the findings are not  very typical of a meningioma. A dural based metastasis in differential  though, there is no malignancy history in clinical records. Short  interval follow up can be considered in couple weeks to follow up this  finding.   5. Head MRA demonstrates no definite aneurysm or stenosis of the major  intracranial arteries.  5. Neck MRA demonstrates patent major cervical arteries.    I have personally reviewed the examination and initial interpretation  and I agree with the findings.    MIRNA ETIENNE MD      Medications       aspirin  81 mg Oral Daily     cefTRIAXone  1 g Intravenous Q24H     losartan  25 mg Oral Daily     sodium chloride (PF)  3 mL Intracatheter Q8H     thiamine  500 mg Intravenous TID    Followed by     [START ON 7/15/2020] thiamine  250 mg Intravenous Daily    Followed by     [START ON 7/20/2020] vitamin B1  100 mg Oral Daily

## 2020-07-13 NOTE — PLAN OF CARE
Admission          7/12/2020  9:52 AM  -----------------------------------------------------------  Reason for admission:  Primary team notified of pt arrival.  Admitted from: ED  Via: stretcher  Accompanied by: alone  Belongings: Placed in closet; valuables sent home with family  Admission Profile: complete  Teaching: orientation to unit and call light- call light within reach, call don't fall, use of console, meal times, when to call for the RN, and enforced importance of safety   Access: PIV  Telemetry:Placed on pt  Ht./Wt.: complete  Code Status verified on armband: yes  2 RN Skin Assessment Completed By: Lynne DEJESUS RN and Isa OROZCO RN   Bed surface reassessed with algorithm and charted: yes  New bed surface ordered: no    Pt status: Pt alert and oriented. Left eye drifting to the left with disturbed peripheral vision. VSS.     Temp:  [98.1  F (36.7  C)-98.4  F (36.9  C)] 98.4  F (36.9  C)  Pulse:  [57-92] 62  Heart Rate:  [58-94] 63  Resp:  [18] 18  BP: (129-215)/() 162/72  SpO2:  [95 %-100 %] 100 %

## 2020-07-13 NOTE — PLAN OF CARE
Transfer  Transferred to: 7A  Via:bed  Reason for transfer:Pt no longer appropriate for 6B- improved patient condition  Family: Aware of transfer  Belongings: Packed and sent with pt  Chart: Delivered with pt to next unit  Medications: Meds sent to new unit with pt  Report given to: ROSALINO Wilkins  Pt status: Pt alert and oriented. VSS. Transferred on bed.

## 2020-07-13 NOTE — PROGRESS NOTES
"Patient transferred from 6B to 7A room 18 at 0600. Pt oriented to unit, room, call light, tv and light control, white communication board, dietary hours and menu including phone number, insisted safety measures \"call don't fall\"and room phone number. One plastic bag belonging with patient.   "

## 2020-07-13 NOTE — ED NOTES
Paged MD: ER room #27 patient B.W., can we something additional for the pt's headache. Given Tylenol 2 hours ago with no relief. Thanks, Dolores *[phone]: Order placed.

## 2020-07-13 NOTE — PLAN OF CARE
"/59 (BP Location: Left arm)   Pulse 62   Temp 98.4  F (36.9  C) (Oral)   Resp 16   Ht 1.651 m (5' 5\")   Wt 109.4 kg (241 lb 2.9 oz)   SpO2 100%   BMI 40.13 kg/m      Neuro: A&Ox4. Neuro checks unchanged. Left eye deviated with blurry vision. PERRLA. Opthalmology consulted.  Cardiac: SR-SB. BP stable overnight.    Respiratory: Sating >92% on RA-2L when sleeping.  GI/: Adequate urine output.  Diet/appetite: Tolerating Regular diet. Intermittent nausea overnight.   Activity:  Assist of 1 up to commode.   Pain: At acceptable level on current regimen. Pt complaining of headache overnight. Declined Tylenol and Tramadol.    Skin: No new deficits noted.  LDA's: PIVx1    Plan: Continue with POC. Notify primary team with changes.    "

## 2020-07-13 NOTE — PLAN OF CARE
"  /75 (BP Location: Left arm)   Pulse 80   Temp 97.6  F (36.4  C) (Oral)   Resp 16   Ht 1.651 m (5' 5\")   Wt 109.4 kg (241 lb 2.9 oz)   SpO2 95%   BMI 40.13 kg/m      Time: 1204-3350.  Reason for admission: Hypertensive crisis.     VS: VSS on RA with O2 sats in high 90s. Afebrile.   Activity: Up with assist x1 with gait belt to bedside commode. Steady on feet. Calls appropriately.   Neuros: A&Ox4. Left eye dysconjugate, deviated, both eyes at times, slow to accommodate, PERRLA otherwise intact. Blurred vision intermittently. Ophthalmology consulted, came by to dilate eyes around 1400 this afternoon, plan to return this evening to finish exam. C/o headache and muscle cramps but declines interventions.   Cardiac: HTN with BPs 140-200s/70-100s, resolving this afternoon to 135/75. HR stable in 80s. Denies chest pain. Receiving scheduled PO Losartan, increased to 50 mg today. Started on PO Amlodipine.   Respiratory: LS clear. Denies SOB or MORENO. Stable on RA. COVID negative.   GI/: Voiding without difficulty into bedside commode. No BM on this shift, LBM 7/11. +BS, +gas. Denies nausea or vomiting.   Diet: Regular diet, tolerating well.   Skin: WDL.   Lines: Right PIV SL'd, receiving scheduled IV Thiamine.   Labs: WDL.     New changes this shift/Plan: VSS on RA except HTN, resolving this afternoon. C/o headaches and muscle cramps, declined interventions. Voiding well, -BM, tolerating diet, denies nausea. Losartan increased, started on Amlodipine. Right PIV SL'd. Opthalmology consult completed. Family updated.   Will continue to monitor & follow POC.  "

## 2020-07-13 NOTE — CONSULTS
OPHTHALMOLOGY CONSULT NOTE  07/13/20    Patient: Shira Marsh      HISTORY OF PRESENTING ILLNESS:     Shira Marsh is a 75 year old female who was admitted for hypertensive emergency with blood pressure as high as 211/106. Patient presented with nausea, vomiting, dizziness, near syncope, and headache. Patient has stopped all her hypertension medications due to side effects. MRI 07/12/2019 showed a 0.8cm partly enhancing lesion in the right frontal area. MRA head and neck shows no evidence of aneurysm. Patient was also evaluated by neurology inpatient on 07/12/2020. Patient complains of diagonal diplopia since yesterday. The double vision resolves when she covers one eye. Patient complains of left arm and leg cramping since yesterday.       10+ review of systems were otherwise negative except for that which has been stated above.      OCULAR/MEDICAL/SURGICAL HISTORIES:     Past Ocular History:  None    Past Medical History:   Diagnosis Date     Degenerative disc disease      Depressive disorder      Enlarged heart      Hypertension        Past Surgical History:   Procedure Laterality Date     APPENDECTOMY       GYN SURGERY      Hysterectomy     GYN SURGERY      Tubal ligation       EXAMINATION:     Base Eye Exam     Visual Acuity       Right Left    Near cc 20/30 20/40   otc readers           Tonometry (tonopen, 2:27 PM)       Right Left    Pressure 15 13          Pupils       Dark Light APD    Right 4 2     Left 4 2 no apd          Visual Fields       Left Right     Full Full          Extraocular Movement       Right Left     -- -- --   --  --   -- -- --    -4 -- --   -4  --   -4 -- --             Neuro/Psych     Oriented x3:  Yes    Mood/Affect:  Normal          Dilation     Both eyes:  1.0% Mydriacyl, 2.5% Aguilar Synephrine @ 5:40 PM            Additional Tests     Color       Right Left    Ishihara 14/14 14/14            Slit Lamp and Fundus Exam     External Exam       Right Left    External Normal Normal           Portable Slit Lamp Exam       Right Left    Lids/Lashes Ptosis Ptosis    Conjunctiva/Sclera White and quiet White and quiet    Cornea Clear Clear    Anterior Chamber Deep and quiet Deep and quiet    Iris Round and reactive Round and reactive    Lens cortical cataract nsc          Fundus Exam       Right Left    Vitreous Normal Normal    Disc Normal, no edema, no pallor Normal, no edema, no pallor    C/D Ratio Vertical 0.4 0.4    C/D Ratio Horizontal 0.4 0.4    Macula Normal Normal    Vessels Normal Normal    Periphery Normal Normal                Labs/Studies/Imaging Performed  MRI 07/13/2020  Impression:  1. No acute infarction.  2. Changes of moderately advanced chronic small vessel ischemic  disease.  3. Scattered foci of susceptibility in the cerebral hemispheres, basal  ganglia, midbrain, david and cerebellum, likely representing chronic  microhemorrhages.  4. 8 mm heterogeneously enhancing right frontal dural based irregular  lesion. This is indeterminate. After evaluation of noncontrast CT  appearance, arterial (CTA) and venous enhancement (MR postgd)  patterns, and basic MR signal characteristics, the findings are not  very typical of a meningioma. A dural based metastasis in differential  though, there is no malignancy history in clinical records. Short  interval follow up can be considered in couple weeks to follow up this  finding.   5. Head MRA demonstrates no definite aneurysm or stenosis of the major  intracranial arteries.  5. Neck MRA demonstrates patent major cervical arteries.     I have personally reviewed the examination and initial interpretation  and I agree with the findings.     MIRNA ETIENNE MD     ASSESSMENT/PLAN:     Shira Marsh is a 75 year old female who presents diagonal diplopia since one day ago. The diplopia resolves after covering one eye. Upon speaking to patient's daughter, she also mentioned a new onset of ptosis in both upper lids. Additionally, patient also complains  of cramping of the left arm and leg along with weakness to the left side of her body.     #Adduction Deficit, left eye.   #Alternating Exotropia, both eyes.   #Ptosis, both upper eyelids.   #Brain Lesion Right Frontal Area.   - Differential diagnosis includes internuclear ophthalmoplegia vs myasthenia gravis. There is worsening of eyelid droop with sustained upgaze. Ice pack lid test shows minimal improvement in eyelid function.     Plan:   - Primary team, please rule out myasthenia gravis. Recommends blood test for acetylcholine receptor antibodies.   - Recommend neuro-ophthalmology evaluation this week.         It is our pleasure to participate in this patient's care and treatment. Please contact us with any further questions or concerns.      Werner Schmitz OD  Adjunct   Ophthalmology   Pager: [phone]    Discussed with Dr. Theresa Denson, neuro-ophthalmology fellow.

## 2020-07-14 ENCOUNTER — OFFICE VISIT (OUTPATIENT)
Dept: OPHTHALMOLOGY | Facility: CLINIC | Age: 75
DRG: 305 | End: 2020-07-14
Attending: OPHTHALMOLOGY
Payer: COMMERCIAL

## 2020-07-14 DIAGNOSIS — H53.10 SUBJECTIVE VISUAL DISTURBANCE: Primary | ICD-10-CM

## 2020-07-14 DIAGNOSIS — H50.15 ALTERNATING EXOTROPIA: Primary | ICD-10-CM

## 2020-07-14 DIAGNOSIS — H53.10 SUBJECTIVE VISUAL DISTURBANCE: ICD-10-CM

## 2020-07-14 LAB
ANION GAP SERPL CALCULATED.3IONS-SCNC: 4 MMOL/L (ref 3–14)
BUN SERPL-MCNC: 10 MG/DL (ref 7–30)
CALCIUM SERPL-MCNC: 8.7 MG/DL (ref 8.5–10.1)
CHLORIDE SERPL-SCNC: 108 MMOL/L (ref 94–109)
CO2 SERPL-SCNC: 28 MMOL/L (ref 20–32)
CREAT SERPL-MCNC: 0.74 MG/DL (ref 0.52–1.04)
ERYTHROCYTE [DISTWIDTH] IN BLOOD BY AUTOMATED COUNT: 14.9 % (ref 10–15)
GFR SERPL CREATININE-BSD FRML MDRD: 80 ML/MIN/{1.73_M2}
GLUCOSE SERPL-MCNC: 102 MG/DL (ref 70–99)
HCT VFR BLD AUTO: 40 % (ref 35–47)
HGB BLD-MCNC: 11.8 G/DL (ref 11.7–15.7)
MCH RBC QN AUTO: 26.7 PG (ref 26.5–33)
MCHC RBC AUTO-ENTMCNC: 29.5 G/DL (ref 31.5–36.5)
MCV RBC AUTO: 91 FL (ref 78–100)
PLATELET # BLD AUTO: 116 10E9/L (ref 150–450)
POTASSIUM SERPL-SCNC: 3.9 MMOL/L (ref 3.4–5.3)
RBC # BLD AUTO: 4.42 10E12/L (ref 3.8–5.2)
SODIUM SERPL-SCNC: 140 MMOL/L (ref 133–144)
WBC # BLD AUTO: 5.4 10E9/L (ref 4–11)

## 2020-07-14 PROCEDURE — 25000132 ZZH RX MED GY IP 250 OP 250 PS 637: Performed by: INTERNAL MEDICINE

## 2020-07-14 PROCEDURE — 25800030 ZZH RX IP 258 OP 636: Performed by: NURSE PRACTITIONER

## 2020-07-14 PROCEDURE — 36415 COLL VENOUS BLD VENIPUNCTURE: CPT | Performed by: STUDENT IN AN ORGANIZED HEALTH CARE EDUCATION/TRAINING PROGRAM

## 2020-07-14 PROCEDURE — 83516 IMMUNOASSAY NONANTIBODY: CPT | Performed by: STUDENT IN AN ORGANIZED HEALTH CARE EDUCATION/TRAINING PROGRAM

## 2020-07-14 PROCEDURE — 12000026 ZZH R&B TRANSPLANT

## 2020-07-14 PROCEDURE — 85027 COMPLETE CBC AUTOMATED: CPT | Performed by: INTERNAL MEDICINE

## 2020-07-14 PROCEDURE — 92060 SENSORIMOTOR EXAMINATION: CPT | Mod: ZF | Performed by: OPHTHALMOLOGY

## 2020-07-14 PROCEDURE — 80048 BASIC METABOLIC PNL TOTAL CA: CPT | Performed by: INTERNAL MEDICINE

## 2020-07-14 PROCEDURE — 83519 RIA NONANTIBODY: CPT | Performed by: STUDENT IN AN ORGANIZED HEALTH CARE EDUCATION/TRAINING PROGRAM

## 2020-07-14 PROCEDURE — G0463 HOSPITAL OUTPT CLINIC VISIT: HCPCS | Mod: 25,ZF

## 2020-07-14 PROCEDURE — 25000128 H RX IP 250 OP 636: Performed by: NURSE PRACTITIONER

## 2020-07-14 PROCEDURE — 36415 COLL VENOUS BLD VENIPUNCTURE: CPT | Performed by: INTERNAL MEDICINE

## 2020-07-14 PROCEDURE — 25000132 ZZH RX MED GY IP 250 OP 250 PS 637: Performed by: STUDENT IN AN ORGANIZED HEALTH CARE EDUCATION/TRAINING PROGRAM

## 2020-07-14 PROCEDURE — 25000128 H RX IP 250 OP 636: Performed by: INTERNAL MEDICINE

## 2020-07-14 PROCEDURE — 86255 FLUORESCENT ANTIBODY SCREEN: CPT | Performed by: STUDENT IN AN ORGANIZED HEALTH CARE EDUCATION/TRAINING PROGRAM

## 2020-07-14 PROCEDURE — 25000132 ZZH RX MED GY IP 250 OP 250 PS 637: Performed by: NURSE PRACTITIONER

## 2020-07-14 PROCEDURE — 25000132 ZZH RX MED GY IP 250 OP 250 PS 637: Performed by: PHYSICIAN ASSISTANT

## 2020-07-14 RX ORDER — HYDROCHLOROTHIAZIDE 12.5 MG/1
12.5 CAPSULE ORAL DAILY
Status: DISCONTINUED | OUTPATIENT
Start: 2020-07-14 | End: 2020-07-15

## 2020-07-14 RX ORDER — HYDRALAZINE HYDROCHLORIDE 20 MG/ML
10 INJECTION INTRAMUSCULAR; INTRAVENOUS ONCE
Status: COMPLETED | OUTPATIENT
Start: 2020-07-14 | End: 2020-07-14

## 2020-07-14 RX ORDER — HYDRALAZINE HYDROCHLORIDE 20 MG/ML
10 INJECTION INTRAMUSCULAR; INTRAVENOUS EVERY 4 HOURS PRN
Status: DISCONTINUED | OUTPATIENT
Start: 2020-07-14 | End: 2020-07-16 | Stop reason: HOSPADM

## 2020-07-14 RX ORDER — HYDRALAZINE HYDROCHLORIDE 25 MG/1
25 TABLET, FILM COATED ORAL EVERY 6 HOURS
Status: DISCONTINUED | OUTPATIENT
Start: 2020-07-14 | End: 2020-07-15

## 2020-07-14 RX ADMIN — HYDRALAZINE HYDROCHLORIDE 25 MG: 25 TABLET ORAL at 23:28

## 2020-07-14 RX ADMIN — LOSARTAN POTASSIUM 50 MG: 25 TABLET, FILM COATED ORAL at 08:21

## 2020-07-14 RX ADMIN — HYDRALAZINE HYDROCHLORIDE 25 MG: 25 TABLET ORAL at 17:17

## 2020-07-14 RX ADMIN — HYDROCHLOROTHIAZIDE 12.5 MG: 12.5 CAPSULE ORAL at 13:21

## 2020-07-14 RX ADMIN — AMLODIPINE BESYLATE 10 MG: 10 TABLET ORAL at 08:21

## 2020-07-14 RX ADMIN — HYDRALAZINE HYDROCHLORIDE 10 MG: 20 INJECTION INTRAMUSCULAR; INTRAVENOUS at 15:51

## 2020-07-14 RX ADMIN — CLOPIDOGREL BISULFATE 75 MG: 75 TABLET ORAL at 08:21

## 2020-07-14 RX ADMIN — ATORVASTATIN CALCIUM 40 MG: 40 TABLET, FILM COATED ORAL at 20:33

## 2020-07-14 RX ADMIN — THIAMINE HYDROCHLORIDE 500 MG: 100 INJECTION, SOLUTION INTRAMUSCULAR; INTRAVENOUS at 20:41

## 2020-07-14 RX ADMIN — THIAMINE HYDROCHLORIDE 500 MG: 100 INJECTION, SOLUTION INTRAMUSCULAR; INTRAVENOUS at 13:21

## 2020-07-14 RX ADMIN — FLUTICASONE PROPIONATE 2 SPRAY: 50 SPRAY, METERED NASAL at 08:21

## 2020-07-14 RX ADMIN — PROCHLORPERAZINE EDISYLATE 5 MG: 5 INJECTION INTRAMUSCULAR; INTRAVENOUS at 16:07

## 2020-07-14 RX ADMIN — HYDRALAZINE HYDROCHLORIDE 10 MG: 20 INJECTION INTRAMUSCULAR; INTRAVENOUS at 17:17

## 2020-07-14 RX ADMIN — ASPIRIN 81 MG CHEWABLE TABLET 81 MG: 81 TABLET CHEWABLE at 08:21

## 2020-07-14 RX ADMIN — THIAMINE HYDROCHLORIDE 500 MG: 100 INJECTION, SOLUTION INTRAMUSCULAR; INTRAVENOUS at 08:22

## 2020-07-14 ASSESSMENT — MARGIN REFLEX DISTANCE
OD_MRD1: 0.5
OS_MRD1: 0

## 2020-07-14 ASSESSMENT — ACTIVITIES OF DAILY LIVING (ADL)
ADLS_ACUITY_SCORE: 15

## 2020-07-14 ASSESSMENT — VISUAL ACUITY
OS_PH_SC: 20/20
OD_SC: 20/30
METHOD: SNELLEN - LINEAR
OS_SC: 20/30
OD_PH_SC: 20/20

## 2020-07-14 ASSESSMENT — SLIT LAMP EXAM - LIDS
COMMENTS: NORMAL
COMMENTS: NORMAL

## 2020-07-14 ASSESSMENT — CONF VISUAL FIELD
OD_NORMAL: 1
OS_NORMAL: 1

## 2020-07-14 ASSESSMENT — EXTERNAL EXAM - LEFT EYE: OS_EXAM: PTOSIS

## 2020-07-14 ASSESSMENT — LEVATOR FUNCTION
OD_LEVATOR: 16
OS_LEVATOR: 17

## 2020-07-14 ASSESSMENT — EXTERNAL EXAM - RIGHT EYE: OD_EXAM: PTOSIS

## 2020-07-14 ASSESSMENT — TONOMETRY
OS_IOP_MMHG: 14
IOP_METHOD: ICARE
OD_IOP_MMHG: 14

## 2020-07-14 ASSESSMENT — PAIN DESCRIPTION - DESCRIPTORS: DESCRIPTORS: CRAMPING

## 2020-07-14 NOTE — PROGRESS NOTES
[facility], [facility] Progress Note - Hospitalist Service, Gold 8       Date of Admission:  7/12/2020  Assessment & Plan   Shira Marsh is a 75 year old woman with hypertension who stopped taking her medications years ago, mostly due to cost, who was admitted with nausea, vomiting, HA and vision changes and diagnosed with hypertensive emergency. Initial systolic blood pressures were in the 210s, so goal systolics overnight was be roughly 160.      1) Hypertensive Emergency -     Headache improved, but not resolved and still notes decreased visual acuity, but does not c/o double vision.  She was previously on Cozaar, HCTZ and metoprolol.    -increase losartan to 50 mg in am.   On norvasc 10 mg  - 7/14- add hydrochlorothiazide 12.5mg and Hydralazine 10mg IV prn   - Patient has no PCP provider and will need a referral for ongoing hypertension management     * Check orthostatic BP , echo, renal USS    2) Stroke - Neurology feels her eye findings are c/w  Intranuclear ophthalmoplegia and likely represent a brainstem stroke that is not seen on MRI (MRI found no evidence for acute infarction, chronic microhemorrhages and an 8 mm heterogenous right frontal dural lesion of unclear significance which was present in a 2004 CT scan per neurology)  - ophthalmology consultation for recs regarding any vision therapy or patching  - plavix and aspirin dual antiplatelets for 3 weeks, followed by just aspirin.  She should also continue atorvastatin.  follow-up with neurology on discharge.     3) Suspected urinary tract infection - The patient's urine is significant for leukocyte esterase.  Urine cx reviewed. Discontinue ceftriaxone        Diet: Combination Diet Regular Diet Adult    DVT Prophylaxis: Pneumatic Compression Devices  Arias Catheter: not present  Code Status: Full Code           Disposition Plan   Expected discharge: 1-2, recommended to prior living arrangement once blood pressure  "less than 170 SBp and safe disposition plan/ TCU bed available.  Entered: Sergio Escobar MD, MD 07/14/2020, 3:35 PM       The patient's care was discussed with the Bedside Nurse, Care Coordinator/ and Patient.    Sergio Escobar MD, MD  Hospitalist Service, [address], [place]  Pager: [phone]  Please see sticky note for cross cover information  ______________________________________________________________________    Interval History   Feels better than before but still has some headache and dizziness on standing. No new blurring of vision.   4 point ROS done and neg unless mentioned    Data reviewed today: I reviewed all medications, new labs and imaging results over the last 24 hours. I personally reviewed no images or EKG's today.    Physical Exam   BP (!) 214/95 (BP Location: Right arm)   Pulse 80   Temp 97.9  F (36.6  C) (Oral)   Resp 18   Ht 1.651 m (5' 5\")   Wt 109.4 kg (241 lb 2.9 oz)   SpO2 98%   BMI 40.13 kg/m    Gen- pleasant   HEENT- NAD, JORJE  Neck- supple, no JVD elevation, no thyromegaly  CVS- I+II+ no m/r/g  RS- CTABS  Abdo- soft, no tenderness . No g/r/r  Ext- no edema       Data   BMP  Recent Labs   Lab 07/14/20 0619 07/13/20  0858 07/12/20  1015    141 141   POTASSIUM 3.9 3.6 3.6   CHLORIDE 108 111* 110*   MARTHA 8.7 8.2* 8.6   CO2 28 26 29   BUN 10 7 10   CR 0.74 0.78 0.78   * 92 106*     CBC  Recent Labs   Lab 07/14/20 0619 07/13/20  0858 07/12/20  1015   WBC 5.4 5.1 4.6   RBC 4.42 4.18 4.47   HGB 11.8 11.1* 12.0   HCT 40.0 37.7 39.9   MCV 91 90 89   MCH 26.7 26.6 26.8   MCHC 29.5* 29.4* 30.1*   RDW 14.9 14.9 14.6   * 104* 121*     INR  Recent Labs   Lab 07/12/20  1015   INR 1.10     LFTs  Recent Labs   Lab 07/12/20  1015   ALKPHOS 81   AST 6   ALT 12   BILITOTAL 0.4   PROTTOTAL 6.9   ALBUMIN 3.4      PANC  Recent Labs   Lab 07/12/20  1015   LIPASE 44*       "

## 2020-07-14 NOTE — CONSULTS
07/14/20 1123 Klisch, Christine M, RN     Stroke Education Note     The following information has been reviewed with the patient and family:     1. Warning signs of stroke     2. Calling 911 if having warning signs of stroke     3. All modifiable risk factors: hypertension, CAD, atrial fib, diabetes, hypercholesterolemia, smoking, substance abuse, diet, physical inactivity, obesity, sleep apnea.     4. Patient's risk factors for stroke which include: HTN,diet     5. Follow-up plan for after discharge     6. Discharge medications which include: cozaar,norvasc,plavix     In addition, the [facility] Stroke Class Handout has been given to the patient and family.     Learner's response to risk factors / lifestyle modification education: Taking steps      Christine M Klisch, RN

## 2020-07-14 NOTE — PLAN OF CARE
"BP (!) 173/88 (BP Location: Right arm)   Pulse 80   Temp 98  F (36.7  C) (Oral)   Resp 18   Ht 1.651 m (5' 5\")   Wt 109.4 kg (241 lb 2.9 oz)   SpO2 97%   BMI 40.13 kg/m   /91 at 0400. OVSS on RA. Order to notified MD for SBP >180. Text page sent to [facility] cross [facility] regarding BP of 181/96, no new orders. Patient c/o pain intermittent cramping pain in her right leg. She states that she has these pains at home and she does not take anything for it, she just waits for it to pass. They don't usually last long. Patient denies nausea. Urine Output - voided x 1, not saved. Bowel Function - last recorded BM 7/11. Activity - Up with 1 assist.    "

## 2020-07-14 NOTE — PLAN OF CARE
"BP (!) 160/70 (BP Location: Left arm)   Pulse 80   Temp 98.1  F (36.7  C) (Oral)   Resp 16   Ht 1.651 m (5' 5\")   Wt 109.4 kg (241 lb 2.9 oz)   SpO2 95%   BMI 40.13 kg/m    Patient A&O. Up with an assist of one to use commode, can reposition in bed independently. Pt complains of intermittent blurry vision, ophthalmology performed exam, recommended pt follow up. R PIV CDI. Pt experienced intermittent muscle cramping in the LLE and left wrist, denied anything for pain. No bowel movement this shift. Patient hypertensive throughout shift (172/69, 147/67, 160/70). Patient calls appropriately and is willing to make needs known.   "

## 2020-07-14 NOTE — PROGRESS NOTES
Assessment & Plan     Shira Marsh is a 75 year old female with the following diagnoses:   1. Alternating exotropia    2. Subjective visual disturbance       Sent for consultation by Dr. Schmitz for double vision.  75 year old woman presented to ED with hypertensive emergency (BP: 270) associated with lightheadedness and double vision and bilateral ptosis that started Saturday night. She mentions that her double has improved.  Did not have ptosis prior to Saturday.  Does not know if there is diurnal variation to the double vision.       MRI brain 7/12/2020  Impression:  1. No acute infarction.  2. Changes of moderately advanced chronic small vessel ischemic  disease.  3. Scattered foci of susceptibility in the cerebral hemispheres, basal  ganglia, midbrain, david and cerebellum, likely representing chronic  microhemorrhages.  4. 8 mm heterogeneously enhancing right frontal dural based irregular  lesion. This is indeterminate. After evaluation of noncontrast CT  appearance, arterial (CTA) and venous enhancement (MR postgd)  patterns, and basic MR signal characteristics, the findings are not  very typical of a meningioma. A dural based metastasis in differential  though, there is no malignancy history in clinical records. Short  interval follow up can be considered in couple weeks to follow up this  finding.   5. Head MRA demonstrates no definite aneurysm or stenosis of the major  intracranial arteries.  5. Neck MRA demonstrates patent major cervical arteries.    Visual acuity 20/30 both eyes but corrects with pinhole to 20/20.  No fatigability with prolonged upgaze.  Levator function 16 millimeters RIGHT eye and 17 millimeters left eye.  MRD1 0.5 millimeters RIGHT eye and 0 millimeters left eye.  She has Good saccades and pursuits.  Good orbicularis strength.  Anterior segment exam unremarkable both eyes.  Negative ice test.      It is my impression that patient has ptosis and diplopia.  She indicates that the  ptosis is new since Saturday.  If this is the case then this raises the specter of myasthenia gravis.  If the ptosis is longstanding, then that would argue that this is simply an internuclear ophthalmoplegia and that she has enjoyed improvement since yesterday.  I would recommend acetylcholine receptor binding antibodies.  If she experiences diurnal variation while in the hospital, then one could consider giving her Mestinon 60 mg and determining whether she has significant improvement in her ptosis after 1 hour.  We did an ice test in the office today, and this was negative arguing away from myasthenia gravis.  I would recommend that she follow-up with me in 6 to 8 weeks sooner as needed for worsening symptoms.      Before ice       After ice                  Attending Physician Attestation:  Complete documentation of historical and exam elements from today's encounter can be found in the full encounter summary report (not reduplicated in this progress note).  I personally obtained the chief complaint(s) and history of present illness.  I confirmed and edited as necessary the review of systems, past medical/surgical history, family history, social history, and examination findings as documented by others; and I examined the patient myself.  I personally reviewed the relevant tests, images, and reports as documented above.  I formulated and edited as necessary the assessment and plan and discussed the findings and management plan with the patient and family. I personally reviewed the ophthalmic test(s) associated with this encounter, agree with the interpretation(s) as documented by the resident/fellow, and have edited the corresponding report(s) as necessary.  - Tico Denson MD  Neuro-ophthalmology fellow  [facility]

## 2020-07-14 NOTE — PROGRESS NOTES
Gold Crosscover Note    Contacted by nursing. Patient having frequent sneezing with associated rhinorrhea that is prohibitive of sleeping.     Will trial scheduled Flonase with Benadryl available PRN.       Maureen Vargas PA-C  Hospitalist Service  Gold crosscover pager: page job code [phone] via [facility]

## 2020-07-14 NOTE — PROGRESS NOTES
Brief Ophthalmology Progress Note    S: Patient reports that she has intermittent double vision that doesn't go away by covering either eye but that is resolved right now. She denies ever having double vision before.    O: Agree with Dr. Schmitz's examination please refer to note from 7/13 for full documentation.     Patient pharmacologically dilated at the time of examination however no APD was noted on exam earlier by Dr. Schmitz.    EOMs:  Patient with left eye exotropia on straight gaze. Cover uncover testing reveals patient alternates fixation between each eye. Cover cross cover demonstrates bilateral exotropia.     EOM no deficits in right eye. Deficits in the left eye as follows:    -3   0   0  -3        0  -3   -1  0    Patient has bilateral ptosis that appears to be fatigable, worse on prolonged upgaze. Ice pack test x 2 minutes with very slight improvement.     A&P: Agree with Assessment and Plan documented by Dr. Schmitz please refer to note from 7/13.   Patient is a 75 year old female admitted with hypertensive emergency found to have intermittent binocular diplopia. Was evaluated by neurology for stroke work-up. Her imaging brain MRI demonstrates chronic small vessel disease however no acute infarction, MRA head without aneurysm or stenosis and MRA neck with patent major cervical arteries. At this point, given that the patient has left sided new adduction deficit, although there is no infarction noted on MRI, it could be that the patient has a microvascular insult in the brain stem causing an inter-nuclear ophthalmoplegia.     Bilateral ptosis appeared fatigable with only minor improvement on ice-pack test. Recommend further work-up to rule out myasthenia gravis. Please obtain an Anti Ach receptor antibody titer.

## 2020-07-14 NOTE — PLAN OF CARE
"BP (!) 178/71 (BP Location: Right arm)   Pulse 80   Temp 97.9  F (36.6  C) (Oral)   Resp 18   Ht 1.651 m (5' 5\")   Wt 109.4 kg (241 lb 2.9 oz)   SpO2 98%   BMI 40.13 kg/m      [id number]. Hypertensive (214/95 highest), MD notified and PRN IV hydralazine given x 2, PO x 1. Pt now 178/71. Tachy in 120's, OVSS on RA. Pt reports having intermittent leg cramping, denies pain meds. PRN compazine given x 1 for nausea. Pt having some dizziness and vision changes when she stands up, orthostatic blood pressures WNL. Was seen in eye clinic this afternoon. Tolerating a regular diet, good appetite. LPIV w/ TKO. Voiding adequate amounts, not saving. No BM today. Up with A x 1 + walker and GB. Pt scheduled to have Renal US with doppler tomorrow, will be NPO @ [facility]. Will continue to monitor and update with any changes.   "

## 2020-07-14 NOTE — PROGRESS NOTES
"[facility]  General Neurology Progress Note    Patient Name:  Shira Marsh  MRN:  [id number]    :  1945    Patient Summary:  Ms Shira Marsh is a 75 year old female with a PMHx of uncontrolled HTN, depression, and degenerative joint disease who presents with headache and abnormal eye findings in the setting of high blood pressure, suspected midbrain infarct due to subtle diffusion restriction on MRI.     Interval history:   No acute events overnight. She reports doing better today compared to yesterday in terms of her headache.. Still some mild frontal headache. Reports still feeling a little \"whoozy\".  Double vision has completely resolved for me. No nausea or vomiting. Blood pressures ranging from 130s-170s systolic and 50s-80s diastolic.     Physical Examination   Vitals: BP (!) 173/88 (BP Location: Right arm)   Pulse 80   Temp 98  F (36.7  C) (Oral)   Resp 18   Ht 1.651 m (5' 5\")   Wt 109.4 kg (241 lb 2.9 oz)   SpO2 97%   BMI 40.13 kg/m    General: Adult, in NAD, cooperative  HEENT: NC/AT  Chest: Normal work of breathing  Extremities: No LE swelling.  Skin: No rash or lesion.   Psych: Mood pleasant, affect congruent    Neuro:  Mental status: Awake, alert, attentive, oriented. Speech is fluent, no dysarthria.  Cranial nerves: Eyes are dysconjugate. On midline gaze her L eye has some mild exotropia today. R eye is midline. On extraocular movements she has a L eye adduction but improved from yesterday as she is able to cross midline but movement is not full.   She has bilateral ptosis but this also appears improved and is worse on the L today compared to the R. Her convergence is impaired. PERRL 2mm b/l, face symmetric, facial sensation intact, shoulder shrug strong, tongue/uvula midline.   Motor: Tone within normal. 5/5 strength in all 4 extremities with 4/5 strength in shoulder ABduction (reports pain limited). No atrophy or twitches.   Sensory: Intact to LT " in all four extremities  Coordination: FNF no dysmetria  Gait: Deferred    Investigations   MR Brain 7/12/20     Per radiology report:                                                         Impression:  1. No acute infarction.  2. Changes of moderately advanced chronic small vessel ischemic disease.  3. Scattered foci of susceptibility in the cerebral hemispheres, basal ganglia, midbrain, david and cerebellum, likely representing chronic  microhemorrhages.  4. 8 mm heterogeneously enhancing right frontal dural based irregular lesion. This is indeterminate. After evaluation of noncontrast CT  appearance, arterial (CTA) and venous enhancement (MR postgd)  patterns, and basic MR signal characteristics, the findings are not very typical of a meningioma. A dural based metastasis in differential  though, there is no malignancy history in clinical records. Short interval follow up can be considered in couple weeks to follow up this  finding.   5. Head MRA demonstrates no definite aneurysm or stenosis of the major  intracranial arteries.  5. Neck MRA demonstrates patent major cervical arteries.    MR imaging was personally reviewed by myself and Dr Warner. We note subtle diffusion restriction in region of the central caudal nucleus of the midbrain with some ADC correlate but absent FLAIR changes. This was discussed with radiology resident who reviewed images with me and agrees with this finding, which correlates clinically.    Laboratory tests:  B1 level - pending  TSH - 4.71  T4 - wnl  A1c - 5.9  LDL - 84    Assessment and Plan:  Ms Shira Marsh is a 75 year old female with a PMHx of uncontrolled HTN, depression, and degenerative joint disease who presents with headache and abnormal eye findings in the setting of high blood pressure. Headache improved today after better BP control. On examination today her EOM and her ptosis is also improving. She also reports that double vision has resolved. MR Brain was reported as  negative for infarct but we note subtle diffusion restriction in central midbrain region with ADC correlate but negative on FLAIR. 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 The most common etiology for diffusion restriction in this region would be ischemic stroke secondary to small vessel disease. This is especially likely given her age, her uncontrolled hypertension and evidence of small vessel disease on MR imaging. Therefore, we will treat her as an ischemic stroke. Other differentials, such as MS, lyme, sarcoidosis are less likely. Her ptosis was not fatigable to me today, but reasonable to obtain MG panel.     - Dual antiplatelet therapy with ASA 81mg and Plavix 75mg x3 weeks, patient reports that she wasn't taken ASA 81mg prior to her hospitalization, therefore can continue on ASA 81 only lifelong instead of Plavix following three weeks of dual antiplatelets  - Continue treatment of her hypertension for treatment of headache, analgesia PRNs for headache  - Outpatient follow up in Stroke Clinic at discharge (ordered for you)  - MG panel per ophthalmology (ordered for you)  - Eye patch for comfort  - Stroke education (ordered for you)  - No indication for TTE as part of cardiac work up given that etiology of stroke is likely small vessel disease    - Stroke secondary prevention measures/stroke education:   - Long term blood pressure goal of <130/80 (based on SPS3 trial)   - Long term LDL <70. Atorvastatin 40mg HS ordered for you.    - Long term A1c <6   - Encouraged patient to have low salt diet and increase fruits/vegetables   - Encouraged abstaining from tobacco/EtOH   - Advised close follow up with PCP for risk factor monitoring/prevention, especially blood pressure   - Advised to present to the ED ASAP should she develop any stroke-like symptoms in the future    Patient was seen and discussed with Dr. Warner. Thank you for  involving neurology in the care of Shira Marsh.  The Neurology Service will sign off at this time. Please do not hesitate to call with questions/concerns (consult pager [phone]).        Shaniqua Pascal  Neurology PGY4  P: [phone]

## 2020-07-14 NOTE — PROGRESS NOTES
"[facility]  General Neurology Progress Note    Patient Name:  Shira Marsh  MRN:  [id number]    :  1945    Patient Summary:  Ms Shira Marsh is a 75 year old female with a PMHx of uncontrolled HTN, depression, and degenerative joint disease who presents with headache and abnormal eye findings in the setting of high blood pressure.     Interval history:   No acute events overnight. She reports that her headache (which is bifrontal, throbbing, non-radiating) if better this morning. She had had prior episodes of nausea and vomiting in association with the headache but no episodes of n/v today. Further history was elicited from the patient. She reports that her eye symptoms are new. She reports seeing double. Does not quite know when this occurred, states that daughter commented that her eyes look funny.     Physical Examination   Vitals: BP (!) 147/67 (BP Location: Left arm)   Pulse 80   Temp 98.4  F (36.9  C) (Oral)   Resp 18   Ht 1.651 m (5' 5\")   Wt 109.4 kg (241 lb 2.9 oz)   SpO2 95%   BMI 40.13 kg/m    General: Adult, in NAD, cooperative  HEENT: NC/AT  Chest: Normal work of breathing  Extremities: No LE swelling.  Skin: No rash or lesion.   Psych: Mood pleasant, affect congruent    Neuro:  Mental status: Awake, alert, attentive, oriented. Speech is fluent, no dysarthria.  Cranial nerves: Eyes are dysconjugate. On midline gaze her R eye is looking down and out. On extraocular movements she has a L eye adduction defect on L gaze. She has bilateral ptosis worse in the R eye compared to the L. PERRL 2mm b/l, face symmetric, facial sensation intact, shoulder shrug strong, tongue/uvula midline.   Motor: Tone within normal. 5/5 strength in all 4 extremities. No atrophy or twitches.   Reflexes: Normoreflexic and symmetric biceps, patellar, and achilles. Toes down-going.  Sensory: Intact to LT in all four extremities  Coordination: FNF no dysmetria  Gait: " Deferred    Investigations   MR Brain 7/12/20     Per radiology report:                                                         Impression:  1. No acute infarction.  2. Changes of moderately advanced chronic small vessel ischemic disease.  3. Scattered foci of susceptibility in the cerebral hemispheres, basal ganglia, midbrain, david and cerebellum, likely representing chronic  microhemorrhages.  4. 8 mm heterogeneously enhancing right frontal dural based irregular lesion. This is indeterminate. After evaluation of noncontrast CT  appearance, arterial (CTA) and venous enhancement (MR postgd)  patterns, and basic MR signal characteristics, the findings are not very typical of a meningioma. A dural based metastasis in differential  though, there is no malignancy history in clinical records. Short interval follow up can be considered in couple weeks to follow up this  finding.   5. Head MRA demonstrates no definite aneurysm or stenosis of the major  intracranial arteries.  5. Neck MRA demonstrates patent major cervical arteries.    MR imaging was personally reviewed by myself and Dr Warner. We note subtle diffusion restriction in region of the central caudal nucleus of the midbrain with some ADC correlate but absent FLAIR changes. This was discussed with radiology resident who reviewed images with me and agrees with this finding, which correlates clinically.    Laboratory tests:  B1 level - pending  TSH - 4.71  T4 - wnl  A1c - 5.9  LDL - 84    Assessment and Plan:  Ms Shira Marsh is a 75 year old female with a PMHx of uncontrolled HTN, depression, and degenerative joint disease who presents with headache and abnormal eye findings in the setting of high blood pressure. Headache improved today after better BP control. On examination she has bilateral ptosis as well as a partial R third nerve palsy and L eye adduction deficit consistent with an internuclear ophthalmoplegia. Patient reports that her eye findings are  new and she is reporting double vision. MR Brain was reported as negative for infarct but we note subtle diffusion restriction in central midbrain region with ADC correlate but negative on FLAIR. 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 The most common etiology for diffusion restriction in this region would be ischemic stroke secondary to small vessel disease. This is especially likely given her age, her uncontrolled hypertension and evidence of small vessel disease on MR imaging. Therefore, we will treat her as an ischemic stroke. Other differentials, such as MS, lyme, sarcoidosis are less likely.     - Dual antiplatelet therapy with ASA 81mg and Plavix 75mg x3 weeks, followed by Plavix 75mg only (lifelong)  - Long term blood pressure goal of <130/80 (based on SPS3 trial)  - Long term LDL <70. Atorvastatin 40mg HS ordered for you.   - Long term A1c <6  - Continue treatment of her hypertension for treatment of headache, analgesia PRNs for headache  - Outpatient follow up in Stroke Clinic at discharge (ordered for you)    Patient was seen and discussed with Dr. Warner. Neurology will continue to follow.     Shaniqua Pascal  Neurology PGY4  P: [phone]

## 2020-07-14 NOTE — LETTER
2020         RE:  :  MRN: Shira Marsh  1945  [id number]     Dear Dr. Schmitz:    Thank you for asking me to see your very pleasant patient, Shira Marsh, in neuro-ophthalmic consultation.  I would like to thank you for sending your records and I have summarized them in the history of present illness.  My assessment and plan are below.  For further details, please see my attached clinic note.      Assessment & Plan     Shira Marsh is a 75 year old female with the following diagnoses:   1. Alternating exotropia    2. Subjective visual disturbance       Sent for consultation by Dr. Schmitz for double vision.  75 year old woman presented to ED with hypertensive emergency (BP: 270) associated with lightheadedness and double vision and bilateral ptosis that started Saturday night. She mentions that her double has improved.  Did not have ptosis prior to Saturday.  Does not know if there is diurnal variation to the double vision.       MRI brain 2020  Impression:  1. No acute infarction.  2. Changes of moderately advanced chronic small vessel ischemic  disease.  3. Scattered foci of susceptibility in the cerebral hemispheres, basal  ganglia, midbrain, david and cerebellum, likely representing chronic  microhemorrhages.  4. 8 mm heterogeneously enhancing right frontal dural based irregular  lesion. This is indeterminate. After evaluation of noncontrast CT  appearance, arterial (CTA) and venous enhancement (MR postgd)  patterns, and basic MR signal characteristics, the findings are not  very typical of a meningioma. A dural based metastasis in differential  though, there is no malignancy history in clinical records. Short  interval follow up can be considered in couple weeks to follow up this  finding.   5. Head MRA demonstrates no definite aneurysm or stenosis of the major  intracranial arteries.  5. Neck MRA demonstrates patent major cervical arteries.    Visual acuity 20/30 both eyes but corrects  with pinhole to 20/20.  No fatigability with prolonged upgaze.  Levator function 16 millimeters RIGHT eye and 17 millimeters left eye.  MRD1 0.5 millimeters RIGHT eye and 0 millimeters left eye.  She has Good saccades and pursuits.  Good orbicularis strength.  Anterior segment exam unremarkable both eyes.  Negative ice test.      It is my impression that patient has ptosis and diplopia.  She indicates that the ptosis is new since Saturday.  If this is the case then this raises the specter of myasthenia gravis.  If the ptosis is longstanding, then that would argue that this is simply an internuclear ophthalmoplegia and that she has enjoyed improvement since yesterday.  I would recommend acetylcholine receptor binding antibodies.  If she experiences diurnal variation while in the hospital, then one could consider giving her Mestinon 60 mg and determining whether she has significant improvement in her ptosis after 1 hour.  We did an ice test in the office today, and this was negative arguing away from myasthenia gravis.  I would recommend that she follow-up with me in 6 to 8 weeks sooner as needed for worsening symptoms.      Before ice     After ice     Again, thank you for allowing me to participate in the care of your patient.      Sincerely,    Tico Camacho MD  Professor  Ophthalmology Residency   Director of Neuro-Ophthalmology  [employer] Endowed Chair  Departments of Ophthalmology, Neurology, and Neurosurgery  [facility] [address] [place]  [address]  T - [phone]  F - [phone]  [email or web address]    CC: Julio C Lopes MD  [facility]  [address] 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

## 2020-07-14 NOTE — Clinical Note
7/14/2020       RE: Shira Marsh  [address]  [facility] [address]     Dear Colleague,    Thank you for referring your patient, Shira Marsh, to the EYE CLINIC at [facility]. Please see a copy of my visit note below.           Assessment & Plan     Shira Marsh is a 75 year old female with the following diagnoses:   1. Alternating exotropia    2. Subjective visual disturbance       75 year old woman presented to ED with hypertensive emergency (BP: 270) associated with lightheadedness and double vision and bilateral ptosis that started Saturday night. She mentions that her double has improved.  Did not have ptosis prior to Saturday.  Does not know if there is diurnal variation to the double vision.       MRI brain 7/12/2020  Impression:  1. No acute infarction.  2. Changes of moderately advanced chronic small vessel ischemic  disease.  3. Scattered foci of susceptibility in the cerebral hemispheres, basal  ganglia, midbrain, david and cerebellum, likely representing chronic  microhemorrhages.  4. 8 mm heterogeneously enhancing right frontal dural based irregular  lesion. This is indeterminate. After evaluation of noncontrast CT  appearance, arterial (CTA) and venous enhancement (MR postgd)  patterns, and basic MR signal characteristics, the findings are not  very typical of a meningioma. A dural based metastasis in differential  though, there is no malignancy history in clinical records. Short  interval follow up can be considered in couple weeks to follow up this  finding.   5. Head MRA demonstrates no definite aneurysm or stenosis of the major  intracranial arteries.  5. Neck MRA demonstrates patent major cervical arteries.    Visual acuity 20/30 both eyes but corrects with pinhole to 20/20.  No fatigability with prolonged upgaze.  Levator function 16 millimeters RIGHT eye and 17 millimeters left eye.  MRD1 0.5 millimeters RIGHT eye and 0 millimeters left eye.   She has Good saccades and pursuits.  Good orbicularis strength.  Anterior segment exam unremarkable both eyes.  Negative ice test.      It is my impression that patient has ptosis and diplopia       Before ice       After ice                  Attending Physician Attestation:  Complete documentation of historical and exam elements from today's encounter can be found in the full encounter summary report (not reduplicated in this progress note).  I personally obtained the chief complaint(s) and history of present illness.  I confirmed and edited as necessary the review of systems, past medical/surgical history, family history, social history, and examination findings as documented by others; and I examined the patient myself.  I personally reviewed the relevant tests, images, and reports as documented above.  I formulated and edited as necessary the assessment and plan and discussed the findings and management plan with the patient and family. I personally reviewed the ophthalmic test(s) associated with this encounter, agree with the interpretation(s) as documented by the resident/fellow, and have edited the corresponding report(s) as necessary.  - Tico Denson MD  Neuro-ophthalmology fellow  [facility]             Assessment & Plan     Shira Marsh is a 75 year old female with the following diagnoses:   1. Alternating exotropia    2. Subjective visual disturbance       Sent for consultation by Dr. Schmitz for double vision.  75 year old woman presented to ED with hypertensive emergency (BP: 270) associated with lightheadedness and double vision and bilateral ptosis that started Saturday night. She mentions that her double has improved.  Did not have ptosis prior to Saturday.  Does not know if there is diurnal variation to the double vision.       MRI brain 7/12/2020  Impression:  1. No acute infarction.  2. Changes of moderately advanced chronic small vessel ischemic  disease.  3.  Scattered foci of susceptibility in the cerebral hemispheres, basal  ganglia, midbrain, david and cerebellum, likely representing chronic  microhemorrhages.  4. 8 mm heterogeneously enhancing right frontal dural based irregular  lesion. This is indeterminate. After evaluation of noncontrast CT  appearance, arterial (CTA) and venous enhancement (MR postgd)  patterns, and basic MR signal characteristics, the findings are not  very typical of a meningioma. A dural based metastasis in differential  though, there is no malignancy history in clinical records. Short  interval follow up can be considered in couple weeks to follow up this  finding.   5. Head MRA demonstrates no definite aneurysm or stenosis of the major  intracranial arteries.  5. Neck MRA demonstrates patent major cervical arteries.    Visual acuity 20/30 both eyes but corrects with pinhole to 20/20.  No fatigability with prolonged upgaze.  Levator function 16 millimeters RIGHT eye and 17 millimeters left eye.  MRD1 0.5 millimeters RIGHT eye and 0 millimeters left eye.  She has Good saccades and pursuits.  Good orbicularis strength.  Anterior segment exam unremarkable both eyes.  Negative ice test.      It is my impression that patient has ptosis and diplopia.  She indicates that the ptosis is new since Saturday.  If this is the case then this raises the specter of myasthenia gravis.  If the ptosis is longstanding, then that would argue that this is simply an internuclear ophthalmoplegia and that she has enjoyed improvement since yesterday.  I would recommend acetylcholine receptor binding antibodies.  If she experiences diurnal variation while in the hospital, then one could consider giving her Mestinon 60 mg and determining whether she has significant improvement in her ptosis after 1 hour.  We did an ice test in the office today, and this was negative arguing away from myasthenia gravis.  I would recommend that she follow-up with me in 6 to 8 weeks sooner  as needed for worsening symptoms.      Before ice       After ice                  Attending Physician Attestation:  Complete documentation of historical and exam elements from today's encounter can be found in the full encounter summary report (not reduplicated in this progress note).  I personally obtained the chief complaint(s) and history of present illness.  I confirmed and edited as necessary the review of systems, past medical/surgical history, family history, social history, and examination findings as documented by others; and I examined the patient myself.  I personally reviewed the relevant tests, images, and reports as documented above.  I formulated and edited as necessary the assessment and plan and discussed the findings and management plan with the patient and family. I personally reviewed the ophthalmic test(s) associated with this encounter, agree with the interpretation(s) as documented by the resident/fellow, and have edited the corresponding report(s) as necessary.  - Tico Denson MD  Neuro-ophthalmology fellow  [facility]      Again, thank you for allowing me to participate in the care of your patient.      Sincerely,    Tico Camacho MD

## 2020-07-15 ENCOUNTER — APPOINTMENT (OUTPATIENT)
Dept: PHYSICAL THERAPY | Facility: CLINIC | Age: 75
DRG: 305 | End: 2020-07-15
Attending: INTERNAL MEDICINE
Payer: COMMERCIAL

## 2020-07-15 ENCOUNTER — APPOINTMENT (OUTPATIENT)
Dept: CARDIOLOGY | Facility: CLINIC | Age: 75
DRG: 305 | End: 2020-07-15
Attending: INTERNAL MEDICINE
Payer: COMMERCIAL

## 2020-07-15 ENCOUNTER — APPOINTMENT (OUTPATIENT)
Dept: ULTRASOUND IMAGING | Facility: CLINIC | Age: 75
DRG: 305 | End: 2020-07-15
Attending: INTERNAL MEDICINE
Payer: COMMERCIAL

## 2020-07-15 LAB — VIT B1 BLD-MCNC: 301 NMOL/L (ref 70–180)

## 2020-07-15 PROCEDURE — 99232 SBSQ HOSP IP/OBS MODERATE 35: CPT | Performed by: INTERNAL MEDICINE

## 2020-07-15 PROCEDURE — 40000556 ZZH STATISTIC PERIPHERAL IV START W US GUIDANCE

## 2020-07-15 PROCEDURE — 97161 PT EVAL LOW COMPLEX 20 MIN: CPT | Mod: GP

## 2020-07-15 PROCEDURE — 25000132 ZZH RX MED GY IP 250 OP 250 PS 637: Performed by: NURSE PRACTITIONER

## 2020-07-15 PROCEDURE — 25000132 ZZH RX MED GY IP 250 OP 250 PS 637: Performed by: INTERNAL MEDICINE

## 2020-07-15 PROCEDURE — 76770 US EXAM ABDO BACK WALL COMP: CPT

## 2020-07-15 PROCEDURE — 12000026 ZZH R&B TRANSPLANT

## 2020-07-15 PROCEDURE — 99207 ZZC CDG-MDM COMPONENT: MEETS LOW - DOWN CODED: CPT | Performed by: INTERNAL MEDICINE

## 2020-07-15 PROCEDURE — 93306 TTE W/DOPPLER COMPLETE: CPT | Mod: 26 | Performed by: INTERNAL MEDICINE

## 2020-07-15 PROCEDURE — 93306 TTE W/DOPPLER COMPLETE: CPT

## 2020-07-15 PROCEDURE — 25000128 H RX IP 250 OP 636: Performed by: NURSE PRACTITIONER

## 2020-07-15 PROCEDURE — 97530 THERAPEUTIC ACTIVITIES: CPT | Mod: GP

## 2020-07-15 PROCEDURE — 25800030 ZZH RX IP 258 OP 636: Performed by: NURSE PRACTITIONER

## 2020-07-15 PROCEDURE — 25000132 ZZH RX MED GY IP 250 OP 250 PS 637: Performed by: STUDENT IN AN ORGANIZED HEALTH CARE EDUCATION/TRAINING PROGRAM

## 2020-07-15 PROCEDURE — 25000128 H RX IP 250 OP 636: Performed by: INTERNAL MEDICINE

## 2020-07-15 PROCEDURE — 97116 GAIT TRAINING THERAPY: CPT | Mod: GP

## 2020-07-15 PROCEDURE — 40000141 ZZH STATISTIC PERIPHERAL IV START W/O US GUIDANCE

## 2020-07-15 RX ORDER — HYDRALAZINE HYDROCHLORIDE 50 MG/1
50 TABLET, FILM COATED ORAL EVERY 6 HOURS
Status: DISCONTINUED | OUTPATIENT
Start: 2020-07-15 | End: 2020-07-16 | Stop reason: HOSPADM

## 2020-07-15 RX ADMIN — ASPIRIN 81 MG CHEWABLE TABLET 81 MG: 81 TABLET CHEWABLE at 08:52

## 2020-07-15 RX ADMIN — HYDROCHLOROTHIAZIDE 12.5 MG: 12.5 CAPSULE ORAL at 08:52

## 2020-07-15 RX ADMIN — ATORVASTATIN CALCIUM 40 MG: 40 TABLET, FILM COATED ORAL at 19:52

## 2020-07-15 RX ADMIN — HYDRALAZINE HYDROCHLORIDE 25 MG: 25 TABLET ORAL at 05:55

## 2020-07-15 RX ADMIN — LOSARTAN POTASSIUM 50 MG: 25 TABLET, FILM COATED ORAL at 08:52

## 2020-07-15 RX ADMIN — HYDRALAZINE HYDROCHLORIDE 50 MG: 50 TABLET, FILM COATED ORAL at 16:40

## 2020-07-15 RX ADMIN — AMLODIPINE BESYLATE 10 MG: 10 TABLET ORAL at 08:52

## 2020-07-15 RX ADMIN — HYDRALAZINE HYDROCHLORIDE 10 MG: 20 INJECTION INTRAMUSCULAR; INTRAVENOUS at 05:01

## 2020-07-15 RX ADMIN — HYDRALAZINE HYDROCHLORIDE 50 MG: 50 TABLET, FILM COATED ORAL at 10:22

## 2020-07-15 RX ADMIN — CLOPIDOGREL BISULFATE 75 MG: 75 TABLET ORAL at 08:52

## 2020-07-15 RX ADMIN — THIAMINE HYDROCHLORIDE 250 MG: 100 INJECTION, SOLUTION INTRAMUSCULAR; INTRAVENOUS at 10:14

## 2020-07-15 RX ADMIN — HYDRALAZINE HYDROCHLORIDE 50 MG: 50 TABLET, FILM COATED ORAL at 22:32

## 2020-07-15 ASSESSMENT — ACTIVITIES OF DAILY LIVING (ADL)
ADLS_ACUITY_SCORE: 15
ADLS_ACUITY_SCORE: 16

## 2020-07-15 NOTE — PROGRESS NOTES
07/15/20 1100   Quick Adds   Type of Visit Initial PT Evaluation   Living Environment   Lives With spouse   Living Arrangements house   Home Accessibility stairs to enter home;stairs within home   Number of Stairs, Main Entrance 3   Stair Railings, Main Entrance railings safe and in good condition   Number of Stairs, Within Home, Primary 10   Stair Railings, Within Home, Primary railings safe and in good condition   Transportation Anticipated family or friend will provide   Living Environment Comment Pt lives in 2 story home with    Self-Care   Usual Activity Tolerance moderate   Current Activity Tolerance fair   Regular Exercise No   Equipment Currently Used at Home cane, straight;walker, rolling   Activity/Exercise/Self-Care Comment Pt IND at baseline, intermittently uses SPC and FWW. Pt reprots living more sedentary lifestyle   Functional Level Prior   Ambulation 0-->independent   Transferring 0-->independent   Toileting 0-->independent   Bathing 0-->independent   Communication 0-->understands/communicates without difficulty   Swallowing 0-->swallows foods/liquids without difficulty   Cognition 0 - no cognition issues reported   Fall history within last six months no   Which of the above functional risks had a recent onset or change? ambulation;transferring   Prior Functional Level Comment Pt IND at baseline. PMH of knee arthritis   General Information   Onset of Illness/Injury or Date of Surgery - Date 07/12/20   Referring Physician Sergio Escobar MD    Patient/Family Goals Statement return home   Pertinent History of Current Problem (include personal factors and/or comorbidities that impact the POC) Per chart, Shira Marsh is a 75 year old woman with hypertension who stopped taking her medications years ago, mostly due to cost, who was admitted with nausea, vomiting, HA and vision changes and diagnosed with hypertensive emergency. Initial systolic blood pressures were in the 210s, so goal  "systolics overnight was be roughly 160.    Precautions/Limitations fall precautions   General Info Comments Activity: ambulate   Cognitive Status Examination   Orientation orientation to person, place and time   Level of Consciousness alert   Follows Commands and Answers Questions 100% of the time   Personal Safety and Judgment intact   Memory intact   Pain Assessment   Patient Currently in Pain No   Posture    Posture Forward head position;Protracted shoulders   Range of Motion (ROM)   ROM Comment WFL   Strength   Strength Comments Per observation, louise LEs >3+/5   Bed Mobility   Bed Mobility Comments mod I   Transfer Skills   Transfer Comments mod I   Gait   Gait Comments SBA   Balance   Balance Comments mod I at baseline   General Therapy Interventions   Planned Therapy Interventions balance training;gait training;neuromuscular re-education;strengthening;progressive activity/exercise   Clinical Impression   Criteria for Skilled Therapeutic Intervention yes, treatment indicated   PT Diagnosis Impaired functional mobility   Influenced by the following impairments weakness, visual changes, decreased activity tolerance, recent stroke   Functional limitations due to impairments below baseline, stairs, IADLs   Clinical Presentation Stable/Uncomplicated   Clinical Presentation Rationale chart review and clinical judgement   Clinical Decision Making (Complexity) Low complexity   Therapy Frequency 5x/week   Predicted Duration of Therapy Intervention (days/wks) 1 week   Anticipated Discharge Disposition Home with Assist;Home with Home Therapy   Risk & Benefits of therapy have been explained Yes   Patient, Family & other staff in agreement with plan of care Yes   [facility] AM-PAC  \"6 Clicks\" V.2 Basic Mobility Inpatient Short Form   1. Turning from your back to your side while in a flat bed without using bedrails? 4 - None   2. Moving from lying on your back to sitting on the side of a flat bed without using " bedrails? 4 - None   3. Moving to and from a bed to a chair (including a wheelchair)? 4 - None   4. Standing up from a chair using your arms (e.g., wheelchair, or bedside chair)? 4 - None   5. To walk in hospital room? 3 - A Little   6. Climbing 3-5 steps with a railing? 3 - A Little   Basic Mobility Raw Score (Score out of 24.Lower scores equate to lower levels of function) 22   Total Evaluation Time   Total Evaluation Time (Minutes) 9

## 2020-07-15 NOTE — PROGRESS NOTES
[facility], [facility] Progress Note - Hospitalist Service, Gold 8       Date of Admission:  7/12/2020  Assessment & Plan   Shira Marsh is a 75 year old woman with hypertension who stopped taking her medications years ago, mostly due to cost, who was admitted with nausea, vomiting, HA and vision changes and diagnosed with hypertensive emergency. Initial systolic blood pressures were in the 210s, so goal systolics overnight was be roughly 160.      1) Hypertensive Emergency -     Headache improved, but not resolved and still notes decreased visual acuity, but does not c/o double vision.  She was previously on Cozaar, HCTZ and metoprolol.    -increase losartan to 50 mg in am.   On norvasc 10 mg  - 7/14- add hydrochlorothiazide 12.5mg and Hydralazine 10mg IV prn   7/15: discontinue hydrochlorothiazide as orthostatic and started Hydralazine 50mg Q6hr  - Patient has no PCP provider and will need a referral for ongoing hypertension management     - echo and renal USS reviewed. Concern for Left Renal artery stenosis- d/w Vascular Sx for review    2) Stroke - Neurology feels her eye findings are c/w  Intranuclear ophthalmoplegia and likely represent a brainstem stroke that is not seen on MRI (MRI found no evidence for acute infarction, chronic microhemorrhages and an 8 mm heterogenous right frontal dural lesion of unclear significance which was present in a 2004 CT scan per neurology)  - ophthalmology consultation for recs regarding any vision therapy or patching  - plavix and aspirin dual antiplatelets for 3 weeks, followed by just aspirin.  She should also continue atorvastatin.  follow-up with neurology on discharge.     3) Suspected urinary tract infection - The patient's urine is significant for leukocyte esterase.  Urine cx reviewed. Discontinue ceftriaxone        Diet: Combination Diet Regular Diet Adult    DVT Prophylaxis: Pneumatic Compression Devices  Arias Catheter: not  "present  Code Status: Full Code           Disposition Plan   Expected discharge: today vs tomorro, recommended to prior living arrangement once blood pressure less than 170 SBp and safe disposition plan/ TCU bed available. Vascular Sx review  Entered: Sergio Escobar MD, MD 07/15/2020, 1:42 PM       The patient's care was discussed with the Bedside Nurse, Care Coordinator/ and Patient.    Sergio Escobar MD, MD  Hospitalist Service, [address], [place]  Pager: [phone]  Please see sticky note for cross cover information  ______________________________________________________________________    Interval History   Feels better . Dizziness improved. No new blurring of vision.   4 point ROS done and neg unless mentioned    Data reviewed today: I reviewed all medications, new labs and imaging results over the last 24 hours. I personally reviewed no images or EKG's today.    Physical Exam   BP (!) 148/79   Pulse 80   Temp 98  F (36.7  C) (Oral)   Resp 16   Ht 1.651 m (5' 5\")   Wt 109.4 kg (241 lb 2.9 oz)   SpO2 97%   BMI 40.13 kg/m     Orthostatic drop +   Gen- pleasant   HEENT- NAD, JORJE  Neck- supple, no JVD elevation, no thyromegaly  CVS- I+II+ no m/r/g  RS- CTABS  Abdo- soft, no tenderness . No g/r/r  Ext- no edema       Data   BMP  Recent Labs   Lab 07/14/20  0619 07/13/20  0858 07/12/20  1015    141 141   POTASSIUM 3.9 3.6 3.6   CHLORIDE 108 111* 110*   MARTHA 8.7 8.2* 8.6   CO2 28 26 29   BUN 10 7 10   CR 0.74 0.78 0.78   * 92 106*     CBC  Recent Labs   Lab 07/14/20  0619 07/13/20  0858 07/12/20  1015   WBC 5.4 5.1 4.6   RBC 4.42 4.18 4.47   HGB 11.8 11.1* 12.0   HCT 40.0 37.7 39.9   MCV 91 90 89   MCH 26.7 26.6 26.8   MCHC 29.5* 29.4* 30.1*   RDW 14.9 14.9 14.6   * 104* 121*     INR  Recent Labs   Lab 07/12/20  1015   INR 1.10     LFTs  Recent Labs   Lab 07/12/20  1015   ALKPHOS 81   AST 6   ALT 12   BILITOTAL 0.4   PROTTOTAL 6.9   ALBUMIN 3.4 "      PANC  Recent Labs   Lab 07/12/20  1015   LIPASE 44*

## 2020-07-15 NOTE — PROGRESS NOTES
Care Coordinator    Care Coordinator - Discharge Planning    Admission Date/Time:  7/12/2020  Attending MD:  Sergio Escobar MD     Data  Date of initial CC assessment:  7/15/2020  Chart reviewed, discussed with interdisciplinary team.   Patient was admitted for:   1. Cerebrovascular accident (CVA) due to stenosis of small artery (H)    2. Hypertensive urgency    3. Cranial nerve palsy    4. Abnormal CT of the head    Shira Marsh is a 75 year old woman with hypertension who stopped taking her medications years ago, mostly due to cost, who was admitted with nausea, vomiting, HA and vision changes and diagnosed with hypertensive emergency. Initial systolic blood pressures were in the 210s, so goal systolics overnight was be roughly 160.      1) Hypertensive Emergency -     Headache improved, but not resolved and still notes decreased visual acuity, but does not c/o double vision.  She was previously on Cozaar, HCTZ and metoprolol.    -increase losartan to 50 mg in am.   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  She should also continue atorvastatin.  follow-up with neurology on discharge.     3) Suspected urinary tract infection - The patient's urine is significant for leukocyte esterase.  Urine cx reviewed.  "Discontinue ceftriaxone --note per Dr Escobar on 7/14/20.  Possible discharge later today if her BP remains 170 or less systolic.     Assessment   Concerns with insurance coverage for discharge needs: None.  Current Living Situation: Patient lives with spouse.  Support System: Supportive and Involved  Services Involved: none  Transportation at Discharge: Car and Family or friend will provide  Transportation to Medical Appointments:    - Name of caregiver: spouse: Colt Marsh  Barriers to Discharge: Blood pressure      Coordination of Care and Referrals: Provided patient/family with options for Home Care.  I called the pt on her room phone and explained my role and she is very pleasant.  I explained that PT is recommending home with assist and HHPT. I explained that she needs to be homebound status to have and RN and HHPT come visit her and she declines, as her son wants to \"take me out to eat and I need to go shopping.\" I explained that she will need to do OP PT and she agrees.  PCP: [facility] and she has not seen her provider in more that a year--I have made her a new PCP and appointment for next week--see below.      Plan  Anticipated Discharge Date:  7.15.2020  Anticipated Discharge Plan:  See above/and below    Mrs Marsh,    You are scheduled to see Dr Alma Rosa Gentile on Thursday, July 23rd @ 8am  [facility]  [address] [address]  Ph: [phone]  ---Dr Julio C Lopes is no longer at that clinic    ________________________________________________________    Commode    I called [facility]  Ph: [phone]   Medicare will not pay for commodes  An XL commode is approx $130-195 and you will have to self pay  Delivery is free at this time  Call them if you are interested in purchasing    Pt has a walker and does not need anymore equipment.    CTS Handoff completed:  NO    Varsha Bryant RN  "

## 2020-07-15 NOTE — PROVIDER NOTIFICATION
Notified Dr. Escobar of patients positive orthostatic BPs. Pt c/o feeling slightly dizzy and lightheaded with standing.    07/15/20 0841 07/15/20 0843 07/15/20 0845   Lying Orthostatic BP   Lying Orthostatic /87  --   --    Lying Orthostatic Pulse 89 bpm  --   --    Sitting Orthostatic BP   Sitting Orthostatic BP  --  169/81  --    Sitting Orthostatic Pulse  --  91 bpm  --    Standing Orthostatic BP   Standing Orthostatic BP  --   --  154/87   Standing Orthostatic Pulse  --   --  108 bpm

## 2020-07-15 NOTE — PLAN OF CARE
"BP (!) 148/79   Pulse 80   Temp 98  F (36.7  C) (Oral)   Resp 16   Ht 1.651 m (5' 5\")   Wt 109.4 kg (241 lb 2.9 oz)   SpO2 97%   BMI 40.13 kg/m       Neuro: A/Ox4. Pt states blurry vision has improved and denied double vision  Cardiac: SBP improved to 140s, see flowsheets. Afebrile. Denies CP  Respiratory: on RA. No respiratory distress noted.   GI/: Voiding without difficulty. BMx1  Diet/Appetite: Regular diet, good appetite. Denies nausea.   Skin: no new skin deficits noted.   LDA: PIVx1 saline locked.   Activity: SBA with walker. Pt states dizziness has improved and denies being lightheaded with activity  Pain: Denies  Plan: Renal ultrasound completed and showing L renal artery stenosis, vascular surgery to consult. Possible discharge home today vs tomorrow if BP remains stable. Continue to monitor and follow POC. Notify MD with any changes or concerns     "

## 2020-07-16 ENCOUNTER — PATIENT OUTREACH (OUTPATIENT)
Dept: CARE COORDINATION | Facility: CLINIC | Age: 75
End: 2020-07-16

## 2020-07-16 ENCOUNTER — APPOINTMENT (OUTPATIENT)
Dept: PHYSICAL THERAPY | Facility: CLINIC | Age: 75
DRG: 305 | End: 2020-07-16
Attending: INTERNAL MEDICINE
Payer: COMMERCIAL

## 2020-07-16 VITALS
WEIGHT: 241.18 LBS | OXYGEN SATURATION: 97 % | HEART RATE: 80 BPM | BODY MASS INDEX: 40.18 KG/M2 | DIASTOLIC BLOOD PRESSURE: 67 MMHG | HEIGHT: 65 IN | RESPIRATION RATE: 18 BRPM | TEMPERATURE: 96.5 F | SYSTOLIC BLOOD PRESSURE: 133 MMHG

## 2020-07-16 DIAGNOSIS — I16.1 HYPERTENSIVE EMERGENCY: Primary | ICD-10-CM

## 2020-07-16 LAB
ACHR BIND AB SER-SCNC: 0 NMOL/L (ref 0–0.4)
ACHR BLOCK AB/ACHR TOTAL SFR SER: 11 % (ref 0–26)
STRIA MUS IGG SER QL IF: NORMAL
VIT B1 SERPL-MCNC: >2000 NMOL/L (ref 4–15)

## 2020-07-16 PROCEDURE — 25000132 ZZH RX MED GY IP 250 OP 250 PS 637: Performed by: INTERNAL MEDICINE

## 2020-07-16 PROCEDURE — 25000132 ZZH RX MED GY IP 250 OP 250 PS 637: Performed by: STUDENT IN AN ORGANIZED HEALTH CARE EDUCATION/TRAINING PROGRAM

## 2020-07-16 PROCEDURE — 97530 THERAPEUTIC ACTIVITIES: CPT | Mod: GP

## 2020-07-16 PROCEDURE — 97110 THERAPEUTIC EXERCISES: CPT | Mod: GP

## 2020-07-16 PROCEDURE — 99239 HOSP IP/OBS DSCHRG MGMT >30: CPT | Performed by: INTERNAL MEDICINE

## 2020-07-16 PROCEDURE — 25000132 ZZH RX MED GY IP 250 OP 250 PS 637: Performed by: NURSE PRACTITIONER

## 2020-07-16 PROCEDURE — 97116 GAIT TRAINING THERAPY: CPT | Mod: GP

## 2020-07-16 RX ORDER — CLOPIDOGREL BISULFATE 75 MG/1
75 TABLET ORAL DAILY
Qty: 30 TABLET | Refills: 3 | Status: SHIPPED | OUTPATIENT
Start: 2020-07-17 | End: 2020-07-16

## 2020-07-16 RX ORDER — ATORVASTATIN CALCIUM 40 MG/1
40 TABLET, FILM COATED ORAL EVERY EVENING
Qty: 30 TABLET | Refills: 1 | Status: SHIPPED | OUTPATIENT
Start: 2020-07-16 | End: 2020-09-15

## 2020-07-16 RX ORDER — CLOPIDOGREL BISULFATE 75 MG/1
75 TABLET ORAL DAILY
Qty: 21 TABLET | Refills: 0 | Status: SHIPPED | OUTPATIENT
Start: 2020-07-17 | End: 2020-08-06

## 2020-07-16 RX ORDER — LOSARTAN POTASSIUM 50 MG/1
50 TABLET ORAL DAILY
Qty: 30 TABLET | Refills: 1 | Status: SHIPPED | OUTPATIENT
Start: 2020-07-17 | End: 2020-09-15

## 2020-07-16 RX ORDER — ASPIRIN 81 MG/1
81 TABLET, CHEWABLE ORAL DAILY
Qty: 30 TABLET | Refills: 1 | Status: SHIPPED | OUTPATIENT
Start: 2020-07-17 | End: 2020-09-15

## 2020-07-16 RX ORDER — ACETAMINOPHEN 325 MG/1
650 TABLET ORAL EVERY 4 HOURS PRN
Qty: 1 BOTTLE | Refills: 0 | Status: SHIPPED | OUTPATIENT
Start: 2020-07-16 | End: 2020-08-06

## 2020-07-16 RX ORDER — FLUTICASONE PROPIONATE 50 MCG
2 SPRAY, SUSPENSION (ML) NASAL DAILY
Qty: 15.8 ML | Refills: 1 | Status: SHIPPED | OUTPATIENT
Start: 2020-07-17 | End: 2020-08-06

## 2020-07-16 RX ORDER — AMLODIPINE BESYLATE 10 MG/1
10 TABLET ORAL DAILY
Qty: 30 TABLET | Refills: 1 | Status: SHIPPED | OUTPATIENT
Start: 2020-07-17 | End: 2020-09-15

## 2020-07-16 RX ORDER — HYDRALAZINE HYDROCHLORIDE 50 MG/1
50 TABLET, FILM COATED ORAL EVERY 6 HOURS
Qty: 120 TABLET | Refills: 1 | Status: SHIPPED | OUTPATIENT
Start: 2020-07-16 | End: 2020-09-03

## 2020-07-16 RX ADMIN — HYDRALAZINE HYDROCHLORIDE 50 MG: 50 TABLET, FILM COATED ORAL at 05:40

## 2020-07-16 RX ADMIN — LOSARTAN POTASSIUM 50 MG: 25 TABLET, FILM COATED ORAL at 08:19

## 2020-07-16 RX ADMIN — AMLODIPINE BESYLATE 10 MG: 10 TABLET ORAL at 08:19

## 2020-07-16 RX ADMIN — ASPIRIN 81 MG CHEWABLE TABLET 81 MG: 81 TABLET CHEWABLE at 08:19

## 2020-07-16 RX ADMIN — CLOPIDOGREL BISULFATE 75 MG: 75 TABLET ORAL at 08:19

## 2020-07-16 RX ADMIN — HYDRALAZINE HYDROCHLORIDE 50 MG: 50 TABLET, FILM COATED ORAL at 10:47

## 2020-07-16 ASSESSMENT — ACTIVITIES OF DAILY LIVING (ADL)
ADLS_ACUITY_SCORE: 16
ADLS_ACUITY_SCORE: 15
ADLS_ACUITY_SCORE: 16
ADLS_ACUITY_SCORE: 14

## 2020-07-16 NOTE — PLAN OF CARE
PT -   Discharge Planner PT   Patient plan for discharge: home today?  Current status: Pt is mod I for bed mobility and sit<>stand transfers. Pt is SBA for ambulation with FWW up to ~120ft. Pt ascends/descends 4 steps x3 with 1 rail and close SBA  Barriers to return to prior living situation: medical status, decreased activity tolerance, LE pain/weakness  Recommendations for discharge: Home with assist. HH PT vs OP PT  Rationale for recommendations: Pt demonstrates safety with functional mobility for discharge home once medically stable. Pt will benefit from continued skilled PT to progress strength, activity tolerance, balance, gait to promote IND with mobility and ADLs/IADLs  Entered by: Shell Reilly 2020 8:53 AM     BP sittin/85  BP post stairs: 145/70  BP post gait: 163/73   **pt asymptomatic throughout session. Likely poor readings at times as machines working very slowly      Physical Therapy Discharge Summary    Reason for therapy discharge:    Discharged to home with home therapy.    Progress towards therapy goal(s). See goals on Care Plan in [facility] health record for goal details.  Goals partially met.  Barriers to achieving goals:   limited tolerance for therapy and discharge from facility.    Therapy recommendation(s):    Continued therapy is recommended.  Rationale/Recommendations:  see above.

## 2020-07-16 NOTE — PLAN OF CARE
"/67 (BP Location: Right arm)   Pulse 80   Temp 96.5  F (35.8  C) (Axillary)   Resp 18   Ht 1.651 m (5' 5\")   Wt 109.4 kg (241 lb 2.9 oz)   SpO2 97%   BMI 40.13 kg/m     Neuro: A/Ox4  VS: VSS on RA  Pain: Denies  GI: on regular diet and tolerating good. Denies nausea. BM this morning  : Voiding without difficulty  Skin: intact  PIV: removed  Activity - Independent  Education - Discharge instruction. Medication and BP management compliance.  Plan of Care - Pt's is discharging to her home and her  is at bedside and ready to take her home    "

## 2020-07-16 NOTE — PLAN OF CARE
"VS: BP (!) 152/81 (BP Location: Right arm)   Pulse 80   Temp 98.7  F (37.1  C) (Oral)   Resp 18   Ht 1.651 m (5' 5\")   Wt 109.4 kg (241 lb 2.9 oz)   SpO2 97%   BMI 40.13 kg/m      Current condition: Hypertensive otherwise stable on RA.    Neuro: WDL   Cardio: WDL  Respiratory: WDL, dyspnea on exertion.   GI/: Voids spontaneously, no BM during shift  Skin: WDL   Diet:   Regular   LDA:  LPIV saline locked.   Mobility:  UAL  Pain: Denies.   PRN medications: None given.    Plan of Care: Will continue to monitor and assess for any changes.   "

## 2020-07-16 NOTE — PLAN OF CARE
"BP (!) 174/88 (BP Location: Right arm)   Pulse 80   Temp 98.5  F (36.9  C) (Oral)   Resp 18   Ht 1.651 m (5' 5\")   Wt 109.4 kg (241 lb 2.9 oz)   SpO2 96%   BMI 40.13 kg/m      [id number]: Afebrile. Pt continues to be slightly hypertensive (150s-160s/80s), pt receiving scheduled Hydralazine. OVSS on RA. Pt denies pain. Pt on regular diet, pt was able to eat some dinner, no complaints of nausea. PIV L Forearm SL. Pt voiding but not saving. No BM this shift, per pt report she had a BM earlier in the day, pt is also passing flatus. Pt up with SBA/independently in the room. Plan for patient to potentially discharge tomorrow pending BP control. Call light in reach. Will continue to monitor and follow plan of care.   "

## 2020-07-16 NOTE — CONSULTS
[facility] PATIENT CONSULTATION NOTE  Consulted by: Internal Medicine Team  Reason for consultation: Concern for left renal artery stenosis in the context of HTN emergency    HPI:  Shira Marsh is a 75 year old year old female who has a PMH significant for hypertension who stopped taking her medications several years ago, mostly due to cost, who was admitted with nausea, vomiting, HA and vision changes and diagnosed with hypertensive emergency. Initial systolic blood pressures were in the 210s.  Her symptoms have improved.  Due to vision changes patient was evaluated by neurology and ophthalmology.  MRI negative for stroke bu neurology documentation indicates they feel she had intranuclear ophthalmoplegia and possible small brain stem stroke not seen on MRI.  On ASA and Plavix for this.  BP better controled than admission but still with some high values.  Currently on Amlodipine, Hydralazine and Losartan.  Internal medicine team still adjusting the anti-HTN medications.  Renal artery duplex done.  Right side largely normal.  Left side with likely left renal artery stenosis with velocities at the left renal artery origin of greater than 200 cm/sec.  RI less than 0.8.  Kidney not significantly atrophied by size.  Greater than 9 cm.  Vascular surgery consulted for possible left renal artery intervention.  She is not a current or former smoker.    Review Of Systems:  General: Denies significant fatigue  Respiratory: Denies SOB  Eyes: had some double vision on admission, which is improved currently  Cardio: Denies CP  Gastrointestinal: Denies N/V  Genitourinary: Denies recent change in urination  Musculoskeletal: See HPI  Neurologic: Denies HA  Psychiatric: Denies confusion  Hematology/immunology: no unexpected bruising    PAST MEDICAL HISTORY:  Past Medical History:   Diagnosis Date     Degenerative disc disease      Depressive disorder      Enlarged heart      Hypertension        PAST SURGICAL  "HISTORY:  Past Surgical History:   Procedure Laterality Date     APPENDECTOMY       GYN SURGERY      Hysterectomy     GYN SURGERY      Tubal ligation       FAMILY HISTORY:  Family History   Problem Relation Age of Onset     Stomach Cancer Father        SOCIAL HISTORY:   Social History     Tobacco Use     Smoking status: Never Smoker     Smokeless tobacco: Never Used   Substance Use Topics     Alcohol use: No       TOBACCO USE:  Denies being a smoker    HOME MEDICATIONS:  Prior to Admission medications    Not on File       VITAL SIGNS:  BP (!) 182/80 (BP Location: Right arm)   Pulse 80   Temp 98.7  F (37.1  C) (Oral)   Resp 18   Ht 1.651 m (5' 5\")   Wt 109.4 kg (241 lb 2.9 oz)   SpO2 97%   BMI 40.13 kg/m      Intake/Output Summary (Last 24 hours) at 7/16/2020 0623  Last data filed at 7/15/2020 0900  Gross per 24 hour   Intake 240 ml   Output --   Net 240 ml       Labs:  ROUTINE IP LABS (Last four results)  BMP  Recent Labs   Lab 07/14/20  0619 07/13/20  0858 07/12/20  1015    141 141   POTASSIUM 3.9 3.6 3.6   CHLORIDE 108 111* 110*   MARTHA 8.7 8.2* 8.6   CO2 28 26 29   BUN 10 7 10   CR 0.74 0.78 0.78   * 92 106*     CBC  Recent Labs   Lab 07/14/20  0619 07/13/20  0858 07/12/20  1015   WBC 5.4 5.1 4.6   RBC 4.42 4.18 4.47   HGB 11.8 11.1* 12.0   HCT 40.0 37.7 39.9   MCV 91 90 89   MCH 26.7 26.6 26.8   MCHC 29.5* 29.4* 30.1*   RDW 14.9 14.9 14.6   * 104* 121*     INR  Recent Labs   Lab 07/12/20  1015   INR 1.10       PHYSICAL EXAM:  Constitutional: healthy, alert, no acute distress and cooperative   Cardiovascular: Reg rate to 80s  Respiratory: CTAB anteriorly, breathing unlabored without secondary muscle use  Psychiatric: mentation appears normal and affect normal/bright  Neck: no asymmetry  GI/Abdomen: +BS, abdomen soft, non-tender  MSK: able to move all extremities without weakness or ataxia, 5/5  strength and dorsi-flexion and plantar flexion  Extremities: no open lesions, extremities " warm and well perfused   Hematology: no bruising on visible skin  Pulses: Palpable femoral pulses and radial pulses    IMAGING:  Renal artery duplex done.  Right side largely normal.  Left side with likely left renal artery stenosis with velocities at the left renal artery origin of greater than 200 cm/sec.  RI less than 0.8.  Kidney not significantly atrophied by size.  Greater than 9 cm.  Vascular surgery consulted for possible left renal artery intervention.      Patient Active Problem List   Diagnosis     Pain in joint, lower leg     Depression     HTN (hypertension)     Chronic pain     DJD (degenerative joint disease)     Elevated TSH     DDD (degenerative disc disease)     Hypertensive emergency       ASSESSMENT:  This patient likely does have left renal artery stenosis.  Right side is likely largely normal.  And she has presented with HTN emergency after being off medications for many years, largely due to cost issues and is now just starting a new regimen      PLAN:  -We recomend to continue to titrate her anti-HTN oral regimen as you are doing currently  -She likely does have left renal artery stenosis but we would not do a renal artery intervention such as stent placement immediately in this context of just recently resuming medications  -We would see her in follow up in 4-6 weeks likely, sooner if needed based on her response to the anti-HTN medications to discuss how she is doing on her medications  -If blood pressure continues to be poorly controlled once she has stabilized as best as possible on an oral anti-HTN regimen, we could consider renal artery angiogram with possible intervention  -Currently she is new to her medication regimen after being off medications for many years and we would not want to intervene in this context when medications are just being resumed as any procedure is not without risks and renal artery stenting is only of benefit in very select cases    Discussed pt history, exam,  assessment and plan with Dr. Lugo of the vascular surgery service, who is in agreement with the above.    Neeraj Jhaveri  Vascular Surgery Fellow

## 2020-07-16 NOTE — DISCHARGE SUMMARY
[facility] [place]  Hospitalist Discharge Summary      Date of Admission:  7/12/2020  Date of Discharge:  7/16/2020  Discharging Provider: Sergio Escobar MD, MD  Discharge Team: Hospitalist Service, Gold 8    Discharge Diagnoses   HTN emergency  Stroke  Left real artery stenosis  Ptosis    Follow-ups Needed After Discharge   Follow-up Appointments     Adult [facility] Follow-up and recommended labs and tests      Follow up with primary care provider, Alma Rosa Peace, within 7 days   for hospital follow- up.  The following labs/tests are recommended: BMP .      Follow up with opthalmology, within 6-8 weeks. regarding new diagnosis.    Follow up with Neurology , at (location with clinic name or city) [facility],   within 6-8 weeks  for hospital follow- up.     Follow up with Vascular surgery within 6 weeks (-We would see her in   follow up in 4-6 weeks likely, sooner if needed based on her response to   the anti-HTN medications to discuss how she is doing on her medications  -If blood pressure continues to be poorly controlled once she has   stabilized as best as possible on an oral anti-HTN regimen, we could   consider renal artery angiogram with possible intervention)     Appointments on [place] and/or [facility] (with [facility] or [facility]   provider or service). Call [phone] if you haven't heard regarding   these appointments within 7 days of discharge.             Unresulted Labs Ordered in the Past 30 Days of this Admission     Date and Time Order Name Status Description    7/14/2020 1048 Acetylcholine receptor blocking bigg In process     7/14/2020 1048 Acetylcholine modulating antibody In process     7/14/2020 1048 Striated muscle antibody IgG In process     7/14/2020 1048 Acetylcholine receptor binding In process     7/12/2020 1455 Vitamin B1 plasma In process         Discharge Disposition   Discharged to home  Condition at discharge: Stable    Hospital Course   Shira Marsh is  "a 75 year old woman with hypertension who stopped taking her medications years ago, mostly due to cost, who was admitted with nausea, vomiting, HA and vision changes and diagnosed with hypertensive emergency.       1) Hypertensive Emergency -      She was previously on Cozaar, HCTZ and metoprolol.    She had difficult control of her blood pressures in the multiple medications were started in a sequential manner.  -Before discharge her blood pressure was relatively stable on losartan  50 mg in am,  norvasc 10 mg and hydralazine 50 mg every 6 hours. (HCTZ was started but discontinued because she was positive for orthostatic drop in blood pressure)   - Patient has no PCP provider and will need a referral for ongoing hypertension management     Her renal ultrasound showed possible left renal artery stenosis and she was evaluated by vascular surgery who recommended continued medical management for blood pressure and follow-up in clinic as outpatient.      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  She should also continue atorvastatin.  follow-up with neurology on discharge.    She was also seen by ophthalmology \" patient has ptosis and diplopia.  She indicates that the ptosis is new since Saturday.  If this is the case then this raises the specter of myasthenia gravis.  If the ptosis is longstanding, then that would argue that this is simply an internuclear ophthalmoplegia and that she has enjoyed improvement since yesterday.  I would recommend acetylcholine receptor " "binding antibodies.  If she experiences diurnal variation while in the hospital, then one could consider giving her Mestinon 60 mg and determining whether she has significant improvement in her ptosis after 1 hour.  We did an ice test in the office today, and this was negative arguing away from myasthenia gravis.  I would recommend that she follow-up with me in 6 to 8 weeks sooner as needed for worsening symptoms.\"      3) Suspected urinary tract infection - The patient's urine is significant for leukocyte esterase.  Urine cx reviewed. Discontinued ceftriaxone         Consultations This Hospital Stay   MEDICATION HISTORY IP PHARMACY CONSULT  OPHTHALMOLOGY IP CONSULT  OPHTHALMOLOGY IP CONSULT  PATIENT LEARNING CENTER IP CONSULT  VASCULAR ACCESS CARE ADULT IP CONSULT  PHYSICAL THERAPY ADULT IP CONSULT  OCCUPATIONAL THERAPY ADULT IP CONSULT  VASCULAR ACCESS CARE ADULT IP CONSULT  VASCULAR ACCESS CARE ADULT IP CONSULT  VASCULAR SURGERY ADULT IP CONSULT    Code Status   Full Code    Time Spent on this Encounter   ISergio MD, MD, personally saw the patient today and spent greater than 30 minutes discharging this patient.       Sergio Escobar MD, MD  [facility] [place]  ______________________________________________________________________    Physical Exam   Vital Signs: Temp: 96.5  F (35.8  C) Temp src: Axillary BP: 133/67   Heart Rate: 97 Resp: 18 SpO2: 97 % O2 Device: None (Room air)    Weight: 241 lbs 2.93 oz  Gen- pleasant   HEENT-  ptosis   Neck- supple, no JVD elevation, no thyromegaly  CVS- I+II+ no m/r/g  RS- CTABS  Abdo- soft, no tenderness . No g/r/r  Ext- no edema   Primary Care Physician   Alma Rosa Peace    Discharge Orders      Neurology Adult Referral      Physical Therapy Referral      Reason for your hospital stay    Shira Marsh is a 75 year old woman with hypertension who stopped taking her medications years ago, mostly due to cost, who was admitted with " nausea, vomiting, HA and vision changes and diagnosed with hypertensive emergency. Initial systolic blood pressures were in the 210s, so goal systolics overnight was be roughly 160.     Adult [facility] Follow-up and recommended labs and tests    Follow up with primary care provider, Alma Rosa Peace, within 7 days for hospital follow- up.  The following labs/tests are recommended: BMP .    Follow up with opthalmology, within 6-8 weeks. regarding new diagnosis.    Follow up with Neurology , at (location with clinic name or city) [facility], within 6-8 weeks  for hospital follow- up.     Follow up with Vascular surgery within 6 weeks (-We would see her in follow up in 4-6 weeks likely, sooner if needed based on her response to the anti-HTN medications to discuss how she is doing on her medications  -If blood pressure continues to be poorly controlled once she has stabilized as best as possible on an oral anti-HTN regimen, we could consider renal artery angiogram with possible intervention)     Appointments on [place] and/or [facility] (with [facility] or [facility] provider or service). Call [phone] if you haven't heard regarding these appointments within 7 days of discharge.     Activity    Your activity upon discharge: activity as tolerated     When to contact your care team    Call your primary doctor if you have any of the following: temperature greater than 100.4 or less than 96, any new tingling/ numbness or headache     Discharge Instructions    - plavix and aspirin dual antiplatelets for 3 weeks, followed by just aspirin.  She should also continue atorvastatin.  follow-up with neurology on discharge.     Diet    Follow this diet upon discharge: Orders Placed This Encounter      Combination Diet Regular Diet Adult       Significant Results and Procedures   Results for orders placed or performed during the hospital encounter of 07/12/20   XR Chest Port 1 View    Narrative    Exam: XR CHEST PORT 1 VW, 7/12/2020  10:57 AM    Indication: chest pain    Comparison: Chest x-ray 7/14/2014    Findings:   Portable supine AP radiograph the chest. Stable enlargement of the  cardiac silhouette. Unchanged elevation of the right hemidiaphragm. No  pneumothorax or pleural effusion. No focal airspace opacities.  Presumed 2 cm calcified splenic artery aneurysm.      Impression    Impression:   1. Stable cardiomegaly.  2. No focal airspace opacities.  3. Presumed calcified splenic artery aneurysm.    I have personally reviewed the examination and initial interpretation  and I agree with the findings.    PONCHO DOUGLASS MD   Head CT w/o contrast     Value    Radiologist flags Subcentimeter hyperdensity at the right anterior (AA)    Narrative    Head CT without contrast, 7/12/2020 11:01 AM    History: Presents with dizziness, nausea and vomiting and new eye  problem. Left 3rd cranial nerve palsy. No history of trauma.    Comparison: No previous study.    Technique: Using multidetector thin collimation helical acquisition  technique, axial, coronal and sagittal CT images from the skull base  to the vertex were obtained without intravenous contrast.    Findings:   Hypodensity within the left posterior parasagittal occipital lobe,  likely representing volume averaging of adjacent prominent sulcus.     6 x 6 mm hyperdensity in the right frontal cortical region, could be  intra-axial versus extra-axial, difficult to differentiate. There is  no mass effect, or midline shift. Gray/white matter differentiation in  both cerebral hemispheres is preserved. Ventricles are proportionate  to the cerebral sulci. The basal cisterns are clear. Confluent  hypoattenuation in supratentorial periventricular white matter most  compatible with moderate chronic microvascular ischemic changes.    The bony calvaria and the bones of the skull base are normal. The  visualized portions of the paranasal sinuses and mastoid air cells are  clear.       Impression     Impression:  Subcentimeter roundish hyperdensity in the right frontal cortical  region. Could be an intra-axial versus an extra-axial lesion. If it is  an intra-axial lesion could be a small focus of hemorrhage versus a  vascular malformation (cavernous malformation). MRI with contrast is  recommended to further evaluate.     [Critical Result: Subcentimeter hyperdensity at the right anterior  frontal area, could be an hemorrhage.]    Finding was identified on 7/12/2020 10:52 AM.     Dr. Donohue was contacted by Dr. Sullivan at 7/12/2020 10:59 AM and  verbalized understanding of the critical finding.      I have personally reviewed the examination and initial interpretation  and I agree with the findings.    MIRNA ETIENNE MD   CTA Head Neck with Contrast    Narrative    CTA  HEAD NECK WITH CONTRAST 7/12/2020 11:01 AM    CT angiogram of the neck   CT angiogram of the base of the brain with contrast  Reconstruction by the Radiologist on the 3D workstation    Provided History: Cranial nerve abnormality.    Comparison:  No previous study.      Technique:  HEAD and NECK CTA: During rapid bolus intravenous injection of  nonionic contrast material, axial images were obtained using thin  collimation multidetector helical technique from the base of the upper  aortic arch through the [place] of [place]. This CT angiogram data was  reconstructed at thin intervals with mild overlap. Images were sent to  the WHILLa workstation, and 3D reconstructions were obtained. The  axial source images, multiplanar reformations, 3D reconstructions in  both maximum intensity projection display and volume rendered models  were reviewed, with reconstructions performed by the technologist and  the radiologist.    Contrast: Isovue 370    Findings:    Head CTA demonstrates no aneurysm or stenosis of the major  intracranial arteries. Bilateral internal carotid arteries have  tortuous and retropharyngeal course. Atherosclerotic calcifications  within the  bilateral carotid siphons. Bilateral posterior  communicating arteries patent, very thin on the right. Anterior  communicating artery is patent.    Neck CTA: The arterial structures in the neck particularly at the  level of the origin are tortuous.  The common carotid arteries and  internal carotid arteries demonstrate retropharyngeal course, an  anatomical variation. They are patent. Vertebral arteries are patent.  Vertebral arteries are tortuous as well. No hemodynamically  significant stenosis are noted. Bilateral jugular veins which are not  opacified on this arteriogram imaging as expected but appear engorged  particularly on the right, this is of no clinical significance.       Impression    Impression:    1. Head CTA demonstrates no aneurysm or stenosis of the major  intracranial arteries.   2. Neck CTA demonstrates no stenosis of the major cervical arteries.    I have personally reviewed the examination and initial interpretation  and I agree with the findings.    MIRNA ETIENNE MD   MR Brain for Stroke [facility] w/o & w Contras    Narrative    MRI brain without and with contrast  MRA of the head without contrast  Neck MRA without and with contrast    Provided History:  none provided.    Information obtained from EMR: Dizziness, headache and left visual  disturbances.  ICD-10:    Comparison:  CT head 7/12/2020      Technique:   Brain MRI:  Axial diffusion, FLAIR, T2-weighted, susceptibility, and  coronal T1-weighted images were obtained without intravenous contrast.  Following intravenous gadolinium-based contrast administration, axial  and coronal T1-weighted images were obtained.    Head MRA: 3D time-of-flight MRA of the [place] was performed  without intravenous contrast.  Neck MRA:  Limited non contrast 2DTOF images were obtained of the  mid-cervical region. Following intravenous gadolinium-based contrast  administration, a contrast enhanced MRA of the neck/cervical vessels  was  performed.  Three-dimensional reconstructions of the neck and head MRA were  created, which were reviewed by the radiologist.    Dose: 10CC GADAVIST    Findings:   Brain MRI: Axial diffusion weighted images demonstrate no definite  acute infarct. On the FLAIR images, there are confluent and patchy  areas of T2 hyperintensity in the periventricular and subcortical  white matter, nonspecific but likely the sequelae of chronic small  vessel ischemic disease..     Scattered foci of susceptibility effect in both cerebral hemispheres,  the basal ganglia, midbrain, david and cerebellum, likely representing  chronic microhemorrhage.     Contrast-enhanced images of the brain demonstrate 0.8 cm partly  enhancing partially enhancing ovoid lesion in the right frontal area.  The lesion is likely extra-axial and demonstrates T1 hypointensity, T2  hyperintensity (series #26, image 14, series #12, image 14).     Head MRA demonstrates no definite aneurysm or stenosis of the major  intracranial arteries.  Anterior and posterior communicating arteries are patent.  Neck MRA demonstrates patent major cervical arteries. There is  retropharyngeal course of bilateral carotid arteries, normal  anatomical variation.      Impression    Impression:  1. No acute infarction.  2. Changes of moderately advanced chronic small vessel ischemic  disease.  3. Scattered foci of susceptibility in the cerebral hemispheres, basal  ganglia, midbrain, david and cerebellum, likely representing chronic  microhemorrhages.  4. 8 mm heterogeneously enhancing right frontal dural based irregular  lesion. This is indeterminate. After evaluation of noncontrast CT  appearance, arterial (CTA) and venous enhancement (MR postgd)  patterns, and basic MR signal characteristics, the findings are not  very typical of a meningioma. A dural based metastasis in differential  though, there is no malignancy history in clinical records. Short  interval follow up can be considered in  couple weeks to follow up this  finding.   5. Head MRA demonstrates no definite aneurysm or stenosis of the major  intracranial arteries.  5. Neck MRA demonstrates patent major cervical arteries.    I have personally reviewed the examination and initial interpretation  and I agree with the findings.    MIRNA ETIENNE MD   US Renal Complete w Duplex Complete    Narrative    EXAMINATION: US RENAL COMPLETE WITH DOPPLER COMPLETE  7/15/2020 8:31  AM      CLINICAL HISTORY: uncontrolled BP    COMPARISON: Abdominal ultrasound 10/30/2016    FINDINGS:  Right kidney:  Right renal length: 9.1 centimeters.  This is within normal limits for  age.  Previous length: 10.4 cm.    The right kidney is normal in position and echogenicity. There is no  evident calculus or renal scarring. There is no significant urinary  tract dilation.     The right renal vein is patent. Doppler evaluation in the right renal  artery demonstrates normal arterial waveforms. 72 cm/sec at the  origin, 141 cm/sec in the mid artery, and 40 cm/sec at the hilum.  Resistive indices in the arcuate arteries vary between 0.71 and 0.76.    RAR: 1.36    Left kidney:  Left renal length: 10.4 cm.  This is within normal limits for age.  Previous length: [N/A]. cm.    The left kidney is normal in position and echogenicity. There is no  evident calculus or renal scarring. There is no significant urinary  tract dilation.     The left renal vein is patent. The left renal artery at the mid and  hilum are not visualized.  221 cm/sec at the origin, with minimal to  no appreciable diastolic flow. Resistive indices in the arcuate  arteries vary between 0.59 and 0.77.    RAR: 2.13    Visualized portions of the aorta are normal, with a peak systolic  velocity in the upper abdominal aorta above the renal arteries of 104  cm/sec and 81 cm/s below the renal arteries.  Visualized portions of  the IVC are normal.    The urinary bladder is moderately distended and normal in  morphology.  The bladder wall is normal.          Impression    IMPRESSION:    1. Normal grayscale evaluation of the kidneys    2. Increased peak systolic velocity and mildly elevated RAR with  abnormal waveforms concerning for left renal artery stenosis at the  origin.  The left mid renal artery and renal artery at the hilum are  not visualized.     I have personally reviewed the examination and initial interpretation  and I agree with the findings.    RUBENS SIMON MD   Echocardiogram Complete    Narrative    [id number]  [id number]  [id number]  [id number]^JAY^JOSUE           [facility]  Echocardiography Laboratory  [address] [place] [id number]     Name: GEOVANNA CANTU  MRN: [id number]  : 1945  Study Date: 07/15/2020 11:30 AM  Age: 75 yrs  Gender: Female  Patient Location: [facility]  Reason For Study: SOB  Ordering Physician: JOSUE THOMPSON  Referring Physician: KARTIK FAJARDO  Performed By: Analia Joseph RDCS     BSA: 2.1 m2  Height: 65 in  Weight: 241 lb  HR: 101  BP: 214/95 mmHg  _____________________________________________________________________________  __        Procedure  Complete Portable Echo Adult.  _____________________________________________________________________________  __        Interpretation Summary  Global and regional left ventricular function is normal with an EF of 60-65%.  Right ventricular function, chamber size, wall motion, and thickness are  normal.  Pulmonary artery systolic pressure cannot be assessed.  The inferior vena cava is normal.  No pericardial effusion is present.  There has been no change.  _____________________________________________________________________________  __        Left Ventricle  Global and regional left ventricular function is normal with an EF of 60-65%.  Left ventricular size is normal. Borderline concentric wall thickening  consistent with left ventricular hypertrophy is present. Diastolic function  not  assessed due to tachycardia. No regional wall motion abnormalities are  seen.     Right Ventricle  Right ventricular function, chamber size, wall motion, and thickness are  normal.     Atria  The left atrial appendage is normal. It is free of spontaneous echo contrast  and thrombus. Mild left atrial enlargement is present. The atrial septum is  intact as assessed by color Doppler .     Mitral Valve  The mitral valve is normal.        Aortic Valve  Aortic valve is normal in structure and function.     Tricuspid Valve  The tricuspid valve is normal. Pulmonary artery systolic pressure cannot be  assessed.     Pulmonic Valve  The pulmonic valve is normal.     Vessels  The thoracic aorta is normal. The pulmonary artery and bifurcation cannot be  assessed. The inferior vena cava is normal.     Pericardium  No pericardial effusion is present.        Compared to Previous Study  There has been no change.  _____________________________________________________________________________  __  MMode/2D Measurements & Calculations  RVDd: 3.6 cm  IVSd: 1.2 cm     LVIDd: 5.0 cm  LVIDs: 3.5 cm  LVPWd: 1.3 cm  FS: 29.7 %  LV mass(C)d: 244.6 grams  LV mass(C)dI: 114.3 grams/m2  Ao root diam: 3.5 cm  asc Aorta Diam: 3.2 cm  LVOT diam: 2.4 cm  LVOT area: 4.5 cm2  LA Volume (BP): 82.8 ml  LA Volume Index (BP): 38.7 ml/m2  RWT: 0.53           Doppler Measurements & Calculations  LV V1 max P.8 mmHg  LV V1 max: 139.7 cm/sec  LV V1 VTI: 23.7 cm  SV(LVOT): 107.4 ml  SI(LVOT): 50.2 ml/m2     _____________________________________________________________________________  __           Report approved by: Mami Nichole 07/15/2020 01:01 PM        ,   BMP  Recent Labs   Lab 20  0620  0858 20  1015    141 141   POTASSIUM 3.9 3.6 3.6   CHLORIDE 108 111* 110*   MARTHA 8.7 8.2* 8.6   CO2 28 26 29   BUN 10 7 10   CR 0.74 0.78 0.78   * 92 106*     CBC  Recent Labs   Lab 20  0619 20  0858 20  1015   WBC  5.4 5.1 4.6   RBC 4.42 4.18 4.47   HGB 11.8 11.1* 12.0   HCT 40.0 37.7 39.9   MCV 91 90 89   MCH 26.7 26.6 26.8   MCHC 29.5* 29.4* 30.1*   RDW 14.9 14.9 14.6   * 104* 121*     INR  Recent Labs   Lab 07/12/20  1015   INR 1.10     LFTs  Recent Labs   Lab 07/12/20  1015   ALKPHOS 81   AST 6   ALT 12   BILITOTAL 0.4   PROTTOTAL 6.9   ALBUMIN 3.4      PANC  Recent Labs   Lab 07/12/20  1015   LIPASE 44*         Discharge Medications   Current Discharge Medication List      START taking these medications    Details   acetaminophen (TYLENOL) 325 MG tablet Take 2 tablets (650 mg) by mouth every 4 hours as needed for mild pain  Qty: 1 Bottle, Refills: 0    Associated Diagnoses: Hypertensive emergency      amLODIPine (NORVASC) 10 MG tablet Take 1 tablet (10 mg) by mouth daily  Qty: 30 tablet, Refills: 1    Associated Diagnoses: Hypertensive emergency      aspirin (ASA) 81 MG chewable tablet Take 1 tablet (81 mg) by mouth daily  Qty: 30 tablet, Refills: 1    Associated Diagnoses: Hypertensive emergency      atorvastatin (LIPITOR) 40 MG tablet Take 1 tablet (40 mg) by mouth every evening  Qty: 30 tablet, Refills: 1    Associated Diagnoses: Hypertensive emergency      Blood Pressure Monitoring (SPHYGMOMANOMETER) MISC Check BP two times/ day and maintain a diary  Qty: 1 each, Refills: 0    Comments: Automatic BP cuff  Associated Diagnoses: Hypertensive emergency      clopidogrel (PLAVIX) 75 MG tablet Take 1 tablet (75 mg) by mouth daily  Qty: 30 tablet, Refills: 3    Associated Diagnoses: Hypertensive emergency      fluticasone (FLONASE) 50 MCG/ACT nasal spray Spray 2 sprays into both nostrils daily  Qty: 15.8 mL, Refills: 1    Associated Diagnoses: Hypertensive emergency      hydrALAZINE (APRESOLINE) 50 MG tablet Take 1 tablet (50 mg) by mouth every 6 hours  Qty: 120 tablet, Refills: 1    Associated Diagnoses: Hypertensive emergency      losartan (COZAAR) 50 MG tablet Take 1 tablet (50 mg) by mouth daily  Qty: 30 tablet,  Refills: 1    Associated Diagnoses: Hypertensive emergency           Allergies   Allergies   Allergen Reactions     Lisinopril Cough     Talwin [Pentazocine Lactate]      Hallucinations

## 2020-07-16 NOTE — PROGRESS NOTES
Social Work Services Progress Note    Hospital Day: 5  Date of Initial Social Work Evaluation:  Not completed  Collaborated with:  Bedside RN, Pharmacy Liaison, Patient, Care Transition    Data: Patient is a 75 year old woman with hypertension who stopped taking her medications years ago, mostly due to cost, who was admitted with nausea, vomiting, HA and vision changes and diagnosed with hypertensive emergency. Initial systolic blood pressures were in the 210s, so goal systolics overnight was be roughly 160.     Patient discharge today but SW was approached to help with financial concerns.     Intervention:      Bedside RN: SW was approached by bedside RN requesting that SW talk with patient regarding financial concerns.    Pharmacy Liaison: SW spoke with pharmacy liaison. Patient's blood pressure medication costs are about $10.00 a month for all 3 medications.     Patient: SW called patient and spoke with her regarding her financial concerns. SW shared the cost of her new blood pressure medication and patient said that was affordable. Patient shared she had not been taking her medication previously as she could not afford it. When asked how much it was, patient did not know. Patient shared she get $1,203 a month in social security and her  gets $523 per month. Her mortgage is $1,276 per month and then she has to pay for gas, lights, water, car insurance and other necessities. Patient said she has no one in her family to help out. Patient reports she and her  are able to pay for the food they need. Patient has [employer] Advant[employer] Plan but she is not getting help anymore to pay her premiums. Patient is unsure if she can keep this insurance as she barely has money to pay her bills monthly. Patient had been on MA in the past but is now over income. Social Security is also garnishing her and her husbands wages as they were overpaid. Patient does not know when this will end.     SW added the contact  information for the senior linkage line to her discharge instruction.     Care Transition: SW sent a care transition note for ongoing SW to be set up at patient's PCP clinic to help with resource in the community.     Assessment:  Patient and her  are very low income with bills that take up most of their monthly income. Patient feels she can afford the $10.00 a month for her blood pressure medication.     Plan:    Anticipated Disposition:  Home with assist and outpatient PT    Barriers to d/c plan:  None    Follow Up:  None    ABDI Mallory, LGMERRILL  7A   Ph: [phone]  Pager: [phone]

## 2020-07-16 NOTE — DISCHARGE INSTRUCTIONS
Mrs Marsh,    You are scheduled to see Dr Alma Rosa Gentile on Thursday, July 23rd @ 8am  [facility]  [address] [place] [address]  Ph: [phone]  ---Dr Julio C Lopes is no longer at that clinic    ________________________________________________________    Commode    [facility]  Ph: [phone]   Medicare will not pay for commodes  An XL commode is approx $130-195 and you will have to self pay  Delivery is free at this time  Call them if you are interested in purchasing      _________________________________________________________    Insurance and Resource Questions    Please call the Senior Linkage Line at [phone] to discuss your concerns with your insurance and lack of income

## 2020-07-17 LAB — ACHR MOD AB/ACHR TOTAL SFR SER: 0 %

## 2020-07-17 NOTE — PROGRESS NOTES
[facility]: Post-Discharge Note  SITUATION                                                      Admission:    Admission Date: 07/12/20   Reason for Admission: HTN emergency  Discharge:   Discharge Date: 07/16/20  Discharge Diagnosis: HTN emergency  Discharge Service: hospitalist  Discharge Plan: pcp cardiology    BACKGROUND                                                      Shira Marsh is a 75 year old woman with hypertension who stopped taking her medications years ago, mostly due to cost, who was admitted with nausea, vomiting, HA and vision changes and diagnosed with hypertensive emergency.    ASSESSMENT      Discharge Assessment  Patient reports symptoms are: Improved(states she has been feeling a little woozy when standing up maybe because of medication? hasn't been drinking much water lately. Has not taken blood pressure today yet)  Does the patient have all of their medications?: Yes  Does patient know what their new medications are for?: Yes  Does patient have a follow-up appointment scheduled?: Yes  Does patient have any other questions or concerns?: No    Post-op  Did the patient have surgery or a procedure: No  Fever: No  Chills: No  Eating & Drinking: eating and drinking without complaints/concerns  PO Intake: regular diet  Bowel Function: normal  Urinary Status: voiding without complaint/concerns        PLAN                                                      Outpatient Plan:      Future Appointments   Date Time Provider Department Center   7/23/2020  8:00 AM Alma Rosa Peace MD [facility]   8/4/2020  7:00 AM Jacklyn Booth, PT PTEN [facility]   8/25/2020  9:00 AM Tico Camacho MD HOMER [facility]   8/31/2020 11:00 AM UCUSV1 UCCUS [facility]   9/2/2020  9:00 AM Kim Lugo MD [facility]           Leanne Santiago

## 2020-07-21 ENCOUNTER — DOCUMENTATION ONLY (OUTPATIENT)
Dept: CARE COORDINATION | Facility: CLINIC | Age: 75
End: 2020-07-21

## 2020-07-23 ENCOUNTER — OFFICE VISIT (OUTPATIENT)
Dept: FAMILY MEDICINE | Facility: CLINIC | Age: 75
End: 2020-07-23
Payer: COMMERCIAL

## 2020-07-23 ENCOUNTER — TELEPHONE (OUTPATIENT)
Dept: FAMILY MEDICINE | Facility: CLINIC | Age: 75
End: 2020-07-23

## 2020-07-23 VITALS
HEART RATE: 92 BPM | BODY MASS INDEX: 41.44 KG/M2 | OXYGEN SATURATION: 97 % | TEMPERATURE: 98.2 F | SYSTOLIC BLOOD PRESSURE: 132 MMHG | DIASTOLIC BLOOD PRESSURE: 79 MMHG | WEIGHT: 249 LBS

## 2020-07-23 DIAGNOSIS — Z86.73 HISTORY OF CVA (CEREBROVASCULAR ACCIDENT): ICD-10-CM

## 2020-07-23 DIAGNOSIS — I16.1 HYPERTENSIVE EMERGENCY: Primary | ICD-10-CM

## 2020-07-23 DIAGNOSIS — I70.1 LEFT RENAL ARTERY STENOSIS (H): ICD-10-CM

## 2020-07-23 LAB
BUN SERPL-MCNC: 10.3 MG/DL (ref 7–19)
CALCIUM SERPL-MCNC: 8.8 MG/DL (ref 8.5–10.1)
CHLORIDE SERPLBLD-SCNC: 104.4 MMOL/L (ref 98–110)
CO2 SERPL-SCNC: 25.6 MMOL/L (ref 20–32)
CREAT SERPL-MCNC: 0.7 MG/DL (ref 0.5–1)
GFR SERPL CREATININE-BSD FRML MDRD: 80.6 ML/MIN/1.7 M2
GLUCOSE SERPL-MCNC: 104.5 MG'DL (ref 70–99)
POTASSIUM SERPL-SCNC: 3.8 MMOL/L (ref 3.3–4.5)
SODIUM SERPL-SCNC: 138.5 MMOL/L (ref 132.6–141.4)

## 2020-07-23 NOTE — PROGRESS NOTES
Hospitalization Follow-up Visit         HPI       Hospital Follow-up Visit:    Hospital:  [facility]   Date of Admission: 7/12/20  Date of Discharge: 7/16/20  Reason(s) for Admission: Hypertensive Emergency, CVA            Problems taking medications regularly:  Is taking medications as prescribed, but is very concerned that she is not going to be able to afford them       Post Discharge Medication Reconciliation: discharge medications reconciled, continue medications without change.       Problems adhering to non-medication therapy:  No, but as noted above, future payment very concernign to patient       Medications reviewed by: by myself. Findings include antihypertensive regimen: norvasc, losartan, hydralazine; dual antiplatelet x3 weeks: plavix, asa 81 (asa 81 after 3 weeks have been completed), lipid-lowering: atorvastatin.     Summary of hospitalization:  [facility] discharge summary reviewed:  1) Hypertensive Emergency -      She was previously on Cozaar, HCTZ and metoprolol.    She had difficult control of her blood pressures in the multiple medications were started in a sequential manner.  -Before discharge her blood pressure was relatively stable on losartan  50 mg in am,  norvasc 10 mg and hydralazine 50 mg every 6 hours. (HCTZ was started but discontinued because she was positive for orthostatic drop in blood pressure)   - Patient has no PCP provider and will need a referral for ongoing hypertension management     Her renal ultrasound showed possible left renal artery stenosis and she was evaluated by vascular surgery who recommended continued medical management for blood pressure and follow-up in clinic as outpatient.        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  She should also continue atorvastatin.  follow-up with neurology on discharge.       Diagnostic Tests/Treatments reviewed.  Follow up needed: BMP    Other Healthcare Providers Involved in Patient s Care:   -ophthalmology in 6-8 weeks (scheduled)  - vascular in 4-6 weeks (scheduled)  - renal ultrasound with doppler (scheduled)  - neurology in 6-8 weeks    Update since discharge: stable; occasional headaches. Had chest pain in hospital and day or two after admission, none in the past several days. No related to exertion. Had EKG without signs of ischemia and neg troponins in hospital. Notes blurred vision and some double vision, thinks it may be worse at the end of the day.     Plan of care communicated with patient                     Review of Systems:   CONSTITUTIONAL: fatigue, no unexpected change in weight  SKIN: no worrisome rashes, no worrisome moles, no worrisome lesions  EYES: blurred vision, occasional double vision  ENT: no ear problems, no mouth problems, no throat problems  RESP: no significant cough, no shortness of breath  CV: no current chest pain, no palpitations, leg swelling both legs  GI: no nausea, no vomiting, no constipation, no diarrhea            Physical Exam:     There were no vitals filed for this visit.  There is no height or weight on file to calculate BMI.    GENERAL: healthy, alert and no distress  EYES: Eyes grossly normal to inspection, extraocular movements - intact, and PERRL. Visual acuity grossly intact. No ptosis noted.   RESP: lungs clear to auscultation - no rales, no rhonchi, no wheezes  CV: regular rates and rhythm, normal S1 S2, no S3 or S4 and no murmur, no click or rub -  ABDOMEN: soft, no tenderness, no   hepatosplenomegaly, no masses, normal bowel sounds  MS: 1+ pitting edema bilateral lower extremities  SKIN: no suspicious lesions, no rashes  NEURO: strength and tone- normal, sensory exam- grossly normal, mentation- intact, speech- normal, reflexes- symmetric. CN grossly intact.   PSYCH: Alert and oriented times 3; affect flat, appears sad and tired  LYMPHATICS: ant. cervical- normal, post. cervical- normal, axillary- normal, supraclavicular- normal, inguinal- normal         Results:     Results from the last 24 hours   Results for orders placed or performed in visit on 07/23/20 (from the past 24 hour(s))   Basic Metabolic Panel ([facility])   Result Value Ref Range    Calcium 8.8 8.5 - 10.1 mg/dL    Chloride 104.4 98.0 - 110.0 mmol/L    Carbon Dioxide 25.6 20.0 - 32.0 mmol/L    Creatinine 0.7 0.5 - 1.0 mg/dL    Glucose 104.5 (H) 70.0 - 99.0 mg'dL    Potassium 3.8 3.3 - 4.5 mmol/L    Sodium 138.5 132.6 - 141.4 mmol/L    GFR Estimate 80.6 >60.0 mL/min/1.7 m2    GFR Estimate If Black >90 >60.0 mL/min/1.7 m2    Urea Nitrogen 10.3 7.0 - 19.0 mg/dL       Assessment and Plan      Shira was seen today for hospital f/u.    Diagnoses and all orders for this visit:    Hypertensive emergency  L Renal artery stenosis  Recent CVA  HTN well-controlled on current regimen. Gross neurologic exam WNL. BMP remarkable only for mild hyperglycemia (not fasting). Does have some bilateral pitting edema of the LE, but cardiac exam otherwise unremarkable. Somewhat isolated at home and very worried about ability to pay for medications. Will refer to behavioral health for assistance coping with recent medical issues, referral to pharmD to discuss medications and possibly assist with payment strategies/best medication options for her insurance. Discussed her follow-up that is currently scheduled with ophtho, vascular surgery, PT and her upcoming renal artery ultrasound. Provided dates of upcoming appointments on AVS. Recommend visit with pharmD  within one week and follow-up with me in 2 to recheck blood pressures and follow-up.   -     Basic Metabolic Panel ([facility])  -     MTM referral  -     Behavioral health referral        E&M code to be billed if TCM cannot be: 19971    Type of decision making: High complexity (94778)      Options for treatment and follow-up care were reviewed with the patient  Shira Marsh   engaged in the decision making process and verbalized understanding of the options discussed and agreed with the final plan.      Alma Rosa Peace MD

## 2020-07-23 NOTE — LETTER
July 27, 2020      Shira Marsh  [address]  [facility] [address]        Dear Shira,    Thank you for getting your care at [facility]. Please see below for your test results.    Resulted Orders   Basic Metabolic Panel ([facility])   Result Value Ref Range    Calcium 8.8 8.5 - 10.1 mg/dL    Chloride 104.4 98.0 - 110.0 mmol/L    Carbon Dioxide 25.6 20.0 - 32.0 mmol/L    Creatinine 0.7 0.5 - 1.0 mg/dL    Glucose 104.5 (H) 70.0 - 99.0 mg'dL    Potassium 3.8 3.3 - 4.5 mmol/L    Sodium 138.5 132.6 - 141.4 mmol/L    GFR Estimate 80.6 >60.0 mL/min/1.7 m2    GFR Estimate If Black >90 >60.0 mL/min/1.7 m2    Urea Nitrogen 10.3 7.0 - 19.0 mg/dL       These are all normal results, other than a very slightly elevated blood sugar (which may due to eating prior to the test). We can discuss them more at our next visit if you have any questions.     Sincerely,    Alma Rosa Peace MD

## 2020-07-23 NOTE — TELEPHONE ENCOUNTER
"Mental Health Referral:  Please schedule with Dr. Lai.    Please let patient know this is for primary care behavioral health services.  Therapists at our clinic are able to see patients for 8-12 sessions (video/phone). If further assistance is needed, they will help the patient connect with ongoing services in the community.    Check with the patient if they are able to do a video visit.  They will need access to either a smartphone or a laptop with camera/microphone.  If they can do a video visit, please schedule as a video visit.  If they do not have the technology to do a video visit, please schedule as a telephone visit. For either visit, please put the phone number or email in the appt notes that the provider is supposed to call or send the visit invite.  There are some instructions regarding how the video visits work for patients below. This can be copied/pasted into a Anthill message if the patient would like more information.      If you are unable to reach the patient after two phone attempts, please send a letter and close the encounter.      Thank you!      You can use either your smartphone or a laptop/computer that is set up with a camera and microphone to do a phone visit.  You will select which device you want to use for the virtual visit.  If you use a smartphone/tablet, we will need the phone number to send you a text invitation to the visit.  If you are using a laptop/computer, we will need your email address to send you the invitation to the visit.      Smartphone/tablet:  Go to your Apple Alexis Store or Google Play store to download an alexis called \"AW Touchpoint\"  This is the alexis that you will use for your visit. It is free.  That way when you receive the text invitation, you can just click on the invitation and it will bring right into the visit through the alexis.    Computer/Laptop with Webcam:  It is important that you have set our default web browser to a modern web browswer such as Google " Chrome (recommended), Safari or Edge.  Internet Explorer is not compatible with the telemedicine website.  If you need instructions about how to change your default web browser on a PC, we can email them to you or send them through a Youxiduo message.  If you use a Mac, your default web browser should work already.  If you are using a laptop, please go to [email or web address] to make sure all your settings are good to go.    Please be ready 15 minutes before your visit.  You will receive an invitation to the visit via email or text message (whichever was specified by you) from your provider as soon as the provider is ready.  Please be ready to click the link in the invitation so you can join the visit.  Your provider will send the invitation once.  If you don't join the telemedicine visit within five minutes, the provider will call you to complete a telephone visit instead.  If you don't answer the phone or respond to the invitation within 15 minutes, your provider will likely go onto their next patient and you will need to reschedule your visit.

## 2020-07-23 NOTE — LETTER
July 30, 2020    Shira Marsh  [address] [place] [address]      Dear: Shira Marsh    You were recently referred for a Behavioral Health appointment by your primary care provider at [facility].  We have called twice to schedule this appointment, but have been unable to reach you.  If you are interested in receiving this service, please call [facility] at [phone].  We would be happy to schedule this appointment for you.      Thank you.      Sincerely,    Patient Representative    [facility]  Hours:  Monday-Friday 8:00 am - 5:00 pm   Phone Number: [phone]

## 2020-07-23 NOTE — Clinical Note
Dr. Holder: RENEE  : Please reach out to patient to have in-clinic visit in 2 weeks to follow-up on blood pressures and pharmD appointment (should be after pharmD visit).

## 2020-07-23 NOTE — PROGRESS NOTES
Preceptor Attestation:    Patient seen and evaluated in person. I discussed the patient with the resident. I have verified the content of the note, which accurately reflects my assessment of the patient and the plan of care.   Supervising Physician:  Rodríguez Holder MD, MD.

## 2020-07-24 ENCOUNTER — TELEPHONE (OUTPATIENT)
Dept: FAMILY MEDICINE | Facility: CLINIC | Age: 75
End: 2020-07-24

## 2020-07-24 NOTE — TELEPHONE ENCOUNTER
MTM referral from: [facility] visit (referral by provider)    MTM referral outreach attempt #2 on July 24, 2020 at 1:35 PM      Outcome: Patient is not interested at this time because she does not feel that she needs MTM, will route to MTM Pharmacist/Provider as an FYI. Thank you for the referral.     Zayra Toribio, MTM Coordinator

## 2020-07-28 NOTE — TELEPHONE ENCOUNTER
Spoke with patient and attempted to schedule. But patient stated that she was not feeling well. She asked for a call later.    Melanie Leblanc CMA  [facility] Care Coordinator

## 2020-07-30 NOTE — TELEPHONE ENCOUNTER
This is the 2nd attempt to contact patient for scheduling.     Letter will mailed to the home address listed. 7/30/2020  Melanie Leblanc CMA  [facility] Care Coordinator

## 2020-08-04 ENCOUNTER — THERAPY VISIT (OUTPATIENT)
Dept: PHYSICAL THERAPY | Facility: CLINIC | Age: 75
End: 2020-08-04
Attending: INTERNAL MEDICINE
Payer: COMMERCIAL

## 2020-08-04 DIAGNOSIS — I63.81 CEREBROVASCULAR ACCIDENT (CVA) DUE TO STENOSIS OF SMALL ARTERY (H): ICD-10-CM

## 2020-08-04 DIAGNOSIS — I16.1 HYPERTENSIVE EMERGENCY: ICD-10-CM

## 2020-08-04 NOTE — DISCHARGE INSTRUCTIONS
8/4/2020    - Tightness in neck muscles; most likely contributing to headaches.    - Use heating pad to neck/shoulders for 10 minutes then do the stretches. This will help loosen up your neck muscles. Gentle stretch; do not force    - If headaches persist, tell Dr. Peace to see if there is anything else

## 2020-08-04 NOTE — PROGRESS NOTES
08/04/20 0700   Quick Adds   Quick Adds Vestibular Eval   Type of Visit Initial OP PT Evaluation   General Information   Start of Care Date 08/04/20   Referring Physician Sergio Escobar MD   Orders Evaluate and Treat as Indicated   Order Date 07/15/20   Medical Diagnosis CVA   Onset of illness/injury or Date of Surgery 07/12/20   Surgical/Medical history reviewed Yes   Pertinent history of current problem (include personal factors and/or comorbidities that impact the POC) Patient had stroke on 7/12/2020; had vertigo, nausea and blurry vision. Patient reports SOB with exertion; premorbid. Patient reports headaches in back of head that go into L shoulder   Pertinent Visual History  Patient reports double vision since stroke and spots in vision; intermittent   Prior level of function comment Modified independent; limited to short distance ambulation due to B knee pain   Current Community Support Family/friend caregiver   Living environment House/[facility]   Home/Community Accessibility Comments Has stairs; uses HR   Assistive Devices Comments Has cane, 4WW, WW; doesnt use   Patient/Family Goals Statement None stated   Fall Risk Screen   Fall screen completed by PT   Have you fallen 2 or more times in the past year? No   Have you fallen and had an injury in the past year? No   Is patient a fall risk? No   Pain   Patient currently in pain Yes   Pain comments Chronic B knee pain, headaches   Cognitive Status Examination   Orientation orientation to person, place and time   Level of Consciousness alert   Follows Commands and Answers Questions 100% of the time;able to follow multistep instructions   Personal Safety and Judgment intact   Memory intact   Observation   Observation B eye ptosis   Integumentary   Integumentary No deficits were identified   Posture   Posture Forward head position;Protracted shoulders   Palpation   Palpation Tenderness and tightness demonstrated in cervical musculature (L > R); especially in  anterior scalenes, suboccipitals, upper trapezius and SCMs   Range of Motion (ROM)   ROM Comment Grossly WFL in BUE/BLE. Decreased motion in all cervical motions; pain at end range   Strength   Strength Comments B hip flexion 3-/5 (pain on L), B knee extension/flexion 4/5, B ankle DF 4/5   Bed Mobility   Bed Mobility Comments Modified independent   Transfer Skills   Transfer Comments SPT without device modified independent   Gait   Gait Comments Patient ambulates without device modified independent; antalgic gait pattern with increased lateral sway and pelvic movement, slow maty and decreased stride length   Gait Special Tests   Gait Special Tests 25 FOOT TIMED WALK   Gait Special Tests 25 Foot Timed Walk   Seconds 8.66   Comments no AD   Balance   Balance Comments Did not assess due to B knee pain; patient denies changes   Sensory Examination   Sensory Perception no deficits were identified   Coordination   Coordination no deficits were identified   Coordination Comments intact BLE/BUE SWATHI   Oculomotor Exam   Smooth Pursuit Normal   Saccades Abnormal   Saccades Comments hypometric in all directions   VOR Normal   Convergence Testing Normal   Convergence Testing Comments Uncover/cover test: alternating exophoria   Planned Therapy Interventions   Planned Therapy Interventions stretching;ROM   Clinical Impression   Criteria for Skilled Therapeutic Interventions Met yes, treatment indicated   PT Diagnosis decreased tissue mobility   Influenced by the following impairments pain, tissue restrictions, symptoms   Functional limitations due to impairments decreased participation in activities due to pain   Clinical Presentation Stable/Uncomplicated   Clinical Presentation Rationale personal factors, body systems involved   Clinical Decision Making (Complexity) Low complexity   Therapy Frequency other (see comments)  (1 time)   Predicted Duration of Therapy Intervention (days/wks) 1 day   Risk & Benefits of therapy have  been explained Yes   Patient, Family & other staff in agreement with plan of care Yes   Clinical Impression Comments Patient is a 75 year old female who presents to OP PT s/p hospitalization following stroke on 7/12/2020. Patient reports vision changed, but is at baseline in terms of functional mobility. Patient demonstrates above listed oculomotor dysfunction with tightness in cervical musculature which may be leading to source of headaches. Patient would benefit from 1 skilled PT session for HEP.    Education Assessment   Preferred Learning Style Reading;Demonstration;Pictures/video   Barriers to Learning No barriers   GOALS   PT Eval Goals 1   Goal 1   Goal Identifier HEP   Goal Description Patient will be independent with HEP for maintenance of therapy gains   Target Date 08/04/20   Total Evaluation Time   PT Eval, Low Complexity Minutes (31089) 25     Jacklyn Booth PT, DPT  Physical Therapist  [facility] [place] - [address]  [address] [place] [address]  [email or web address]  Office: [phone]

## 2020-08-04 NOTE — PROGRESS NOTES
Outpatient Physical Therapy Discharge Note     Patient: Shira Marsh  : 1945    Beginning/End Dates of Reporting Period:  2020 to 2020    Referring Provider: Sergio Escobar MD    Therapy Diagnosis: decreased tissue mobility     Client Self Report: see eval    Objective Measurements:     25 foot walk test: 8.66 seconds       Goals:  Goal Identifier HEP   Goal Description Patient will be independent with HEP for maintenance of therapy gains   Target Date 20   Date Met   2020   Progress:       Progress Toward Goals:   Progress this reporting period: Patient seen for 1 treatment session for HEP; patient independent with performance    Plan:  Discharge from therapy.    Discharge:    Reason for Discharge: Patient has met all goals.    Equipment Issued: None    Discharge Plan: Patient to continue home program. Follow up with MD if headaches persist

## 2020-08-05 NOTE — PROGRESS NOTES
HPI       Shira Marsh is a 75 year old  who presents for   Chief Complaint   Patient presents with     RECHECK     follow [facility]      Knee Pain     bilateral edu pain      BP f/u  BPs well controlled. Taking meds well. She is surprised at how well-controlled BPs are.   In hospital 270/70s - worried about BP  Does not get dizzy with getting up from supine. Had this prior to going to hospital.  Done with 3 weeks of Plavix.  In hospital had double vision - now resolved, but some spots.  Has appts set up with vascular, ophtho, neuro.  Mood improved  No issues with taking meds, no concerns.    Knee pain  For years. Had cyst at one knee but burst. Has arthritis - difficulty walking.  Hasn't done anything for pain recently.   Some clicking when walking.   Both knees hurt.   Saw PT 8/4 post-hospital. Not sure if they are planning more sessions.  Has had injections in past - not helpful and very painful.   Would not ever consider surgical management. Her mother had knee replacement and bad experiences with this.    PHQ 8/6/2020   PHQ-9 Total Score 10   Q9: Thoughts of better off dead/self-harm past 2 weeks Not at all     OH-7 SCORE 8/29/2013 9/10/2013 8/6/2020   Total Score 14 16 -   Total Score - - 3     Problem, Medication and Allergy Lists were reviewed and updated if needed..    Patient is an established patient of this clinic..         Review of Systems:   Review of Systems   Respiratory: Negative for shortness of breath and wheezing.    Cardiovascular: Negative for chest pain.   Gastrointestinal: Negative for abdominal pain.   Musculoskeletal: Positive for arthralgias, back pain and gait problem. Negative for neck pain.   Neurological: Negative for weakness, light-headedness and headaches.            Physical Exam:     Vitals:    08/06/20 0804 08/06/20 0812   BP: 124/77 125/77   Pulse: 98    Resp: 16    Temp: 98.9  F (37.2  C)    TempSrc: Oral    SpO2: 97%    Weight: 111.9 kg (246 lb 9.6 oz)      Body  mass index is 41.04 kg/m .  Vitals were reviewed and were normal     Physical Exam  Constitutional:       General: She is not in acute distress.     Appearance: She is well-developed. She is obese. She is not diaphoretic.   HENT:      Head: Normocephalic and atraumatic.   Eyes:      Conjunctiva/sclera: Conjunctivae normal.   Neck:      Musculoskeletal: Normal range of motion.   Cardiovascular:      Rate and Rhythm: Normal rate and regular rhythm.      Heart sounds: No murmur.   Pulmonary:      Effort: Pulmonary effort is normal. No respiratory distress.      Breath sounds: No wheezing or rales.   Musculoskeletal:      Comments: Normal ROM in lower extremities.  L knee: tender along lateral > medial joint lines. Tender with patellar compression on lateral aspect. Strength 5/5 knees, hips. Pain along lateral left thigh with hip flexion. L hip external rotation elicits pain at lateral hip. Negative for impingement at hip. No tenderness to palpation along musculature. Hip roll without discomfort.  R knee: minimal tenderness along medial joint line. Patella without tenderness. Strength 5/5 hips, knees. Negative for impingement at hip.   Gait antalgic.   No tenderness along lumbar spine (hx of back injury per patient).   Neurological:      General: No focal deficit present.      Mental Status: She is alert.           Results:   No testing ordered today    Assessment and Plan        Shira was seen today for recheck and knee pain.    Diagnoses and all orders for this visit:    Hypertension, unspecified type  Left renal artery stenosis (H)  Brainstem stroke (H)  Hospitalized 7/12-7/16 HTN emergency due to being off medications, c/b likely brainstem stroke, REYNOLD. Renal U/S with possible L renal artery stenosis. Consultations inpatient and outpatient:   - Vascular surgery   - Ophthalmology  - Neurology  Currently s/p 3 weeks ASA, plavix. Continuing now on ASA only. Continue lipitor.  BPs very well-managed with current regimen.  Patient reports no issues with q6h hydralazine. No orthostasis. Will continue current regimen losartan 50, norvasc 10, hydral 50 q6h. Will not re-refer to MTM.  Mental health reported to be fair. OH, PHQ reassuring.    Primary osteoarthritis of both knees  ? Patellofemoral OA on L  XR 2008/9 reviewed. Some OA at this time. Likely progressive BL knee OA +/- patellofemoral OA on L, with some component of hip OA particularly on L. Will focus on PT to improve function, pain. Given her medical comorbidites per above, no NSAIDs advised. Will rx topical capsaicin.  Initially planned for XR knees, R hip today - but unable to perform given patient time constraints. Patient does note that she would never consider repeat knee injections, nor surgical intervention. XR unlikely to change our management at this time.  Can consider duloxetine in future.  -     capsaicin (ZOSTRIX) 0.025 % external cream; Apply 1 g topically 4 times daily  -     [facility], PT, HAND AND CHIROPRACTIC REFERRAL - [facility]; Future       Medications Discontinued During This Encounter   Medication Reason     clopidogrel (PLAVIX) 75 MG tablet      fluticasone (FLONASE) 50 MCG/ACT nasal spray      acetaminophen (TYLENOL) 325 MG tablet        Options for treatment and follow-up care were reviewed with the patient. Shira Marsh  engaged in the decision making process and verbalized understanding of the options discussed and agreed with the final plan.    Janny Kahn MD    RTC in 4 weeks  - Re-check BMP  - f/u knee pain, PT  - f/u multiple specialist appointments  - Revisit mental health

## 2020-08-06 ENCOUNTER — OFFICE VISIT (OUTPATIENT)
Dept: FAMILY MEDICINE | Facility: CLINIC | Age: 75
End: 2020-08-06
Payer: COMMERCIAL

## 2020-08-06 VITALS
BODY MASS INDEX: 41.04 KG/M2 | TEMPERATURE: 98.9 F | HEART RATE: 98 BPM | OXYGEN SATURATION: 97 % | SYSTOLIC BLOOD PRESSURE: 125 MMHG | DIASTOLIC BLOOD PRESSURE: 77 MMHG | RESPIRATION RATE: 16 BRPM | WEIGHT: 246.6 LBS

## 2020-08-06 DIAGNOSIS — M17.12 OSTEOARTHRITIS OF LEFT PATELLOFEMORAL JOINT: ICD-10-CM

## 2020-08-06 DIAGNOSIS — I10 HYPERTENSION, UNSPECIFIED TYPE: Primary | ICD-10-CM

## 2020-08-06 DIAGNOSIS — I63.9 BRAINSTEM STROKE (H): ICD-10-CM

## 2020-08-06 DIAGNOSIS — M17.0 PRIMARY OSTEOARTHRITIS OF BOTH KNEES: ICD-10-CM

## 2020-08-06 DIAGNOSIS — I70.1 LEFT RENAL ARTERY STENOSIS (H): ICD-10-CM

## 2020-08-06 RX ORDER — CAPSAICIN 0.025 %
1 CREAM (GRAM) TOPICAL 4 TIMES DAILY
Qty: 60 G | Refills: 1 | Status: SHIPPED | OUTPATIENT
Start: 2020-08-06

## 2020-08-06 ASSESSMENT — ENCOUNTER SYMPTOMS
ARTHRALGIAS: 1
SHORTNESS OF BREATH: 0
HEADACHES: 0
BACK PAIN: 1
NECK PAIN: 0
WEAKNESS: 0
LIGHT-HEADEDNESS: 0
WHEEZING: 0
ABDOMINAL PAIN: 0

## 2020-08-06 ASSESSMENT — ANXIETY QUESTIONNAIRES
3. WORRYING TOO MUCH ABOUT DIFFERENT THINGS: NOT AT ALL
GAD7 TOTAL SCORE: 3
5. BEING SO RESTLESS THAT IT IS HARD TO SIT STILL: NOT AT ALL
6. BECOMING EASILY ANNOYED OR IRRITABLE: NOT AT ALL
2. NOT BEING ABLE TO STOP OR CONTROL WORRYING: NOT AT ALL
IF YOU CHECKED OFF ANY PROBLEMS ON THIS QUESTIONNAIRE, HOW DIFFICULT HAVE THESE PROBLEMS MADE IT FOR YOU TO DO YOUR WORK, TAKE CARE OF THINGS AT HOME, OR GET ALONG WITH OTHER PEOPLE: NOT DIFFICULT AT ALL
7. FEELING AFRAID AS IF SOMETHING AWFUL MIGHT HAPPEN: NEARLY EVERY DAY
1. FEELING NERVOUS, ANXIOUS, OR ON EDGE: NOT AT ALL

## 2020-08-06 ASSESSMENT — PATIENT HEALTH QUESTIONNAIRE - PHQ9
5. POOR APPETITE OR OVEREATING: NOT AT ALL
SUM OF ALL RESPONSES TO PHQ QUESTIONS 1-9: 10

## 2020-08-06 NOTE — PATIENT INSTRUCTIONS
Here is the plan from today's visit    1. Primary osteoarthritis of both knees  - capsaicin (ZOSTRIX) 0.025 % external cream; Apply 1 g topically 4 times daily  Dispense: 60 g; Refill: 1  - [facility], PT, HAND AND CHIROPRACTIC REFERRAL - [facility]; Future - please call to set up physical therapy      Please call or return to clinic if your symptoms don't go away.    Follow up plan  Please make a clinic appointment for follow up with your primary physician Alma Rosa Peace MD in 1 month for follow-up.    Thank you for coming to [facility] today.  Lab Testing:  **If you had lab testing today and your results are reassuring or normal they will be mailed to you or sent through [employer] within 7 days.   **If the lab tests need quick action we will call you with the results.  The phone number we will call with results is # [phone] (home) . If this is not the best number please call our clinic and change the number.  Medication Refills:  If you need any refills please call your pharmacy and they will contact us.   If you need to  your refill at a new pharmacy, please contact the new pharmacy directly. The new pharmacy will help you get your medications transferred faster.   Scheduling:  If you have any concerns about today's visit or wish to schedule another appointment please call our office during normal business hours [phone] (8-5:00 M-F)  If a referral was made to a [facility] and you don't get a call from central scheduling please call [phone].  If a Mammogram was ordered for you at [facility] call [phone] to schedule or change your appointment.  If you had an XRay/CT/Ultrasound/MRI ordered the number is [phone] to schedule or change your radiology appointment.   Medical Concerns:  If you have urgent medical concerns please call [phone] at any time of the day.    Janny Kahn MD

## 2020-08-07 ASSESSMENT — ANXIETY QUESTIONNAIRES: GAD7 TOTAL SCORE: 3

## 2020-08-25 ENCOUNTER — OFFICE VISIT (OUTPATIENT)
Dept: OPHTHALMOLOGY | Facility: CLINIC | Age: 75
End: 2020-08-25
Attending: OPHTHALMOLOGY
Payer: COMMERCIAL

## 2020-08-25 DIAGNOSIS — H53.10 SUBJECTIVE VISUAL DISTURBANCE: ICD-10-CM

## 2020-08-25 DIAGNOSIS — H51.20 INTERNUCLEAR OPHTHALMOPLEGIA, UNSPECIFIED LATERALITY: ICD-10-CM

## 2020-08-25 DIAGNOSIS — H50.34 INTERMITTENT EXOTROPIA, ALTERNATING: Primary | ICD-10-CM

## 2020-08-25 DIAGNOSIS — H53.10 SUBJECTIVE VISUAL DISTURBANCE: Primary | ICD-10-CM

## 2020-08-25 PROCEDURE — G0463 HOSPITAL OUTPT CLINIC VISIT: HCPCS | Mod: 25,ZF

## 2020-08-25 PROCEDURE — 92060 SENSORIMOTOR EXAMINATION: CPT | Mod: ZF | Performed by: OPHTHALMOLOGY

## 2020-08-25 ASSESSMENT — CONF VISUAL FIELD
OD_NORMAL: 1
OS_NORMAL: 1

## 2020-08-25 ASSESSMENT — SLIT LAMP EXAM - LIDS
COMMENTS: NORMAL
COMMENTS: NORMAL

## 2020-08-25 ASSESSMENT — VISUAL ACUITY
OS_SC: 20/30
OS_SC+: +2
OD_SC: 20/25
METHOD: SNELLEN - LINEAR
OD_SC+: -2

## 2020-08-25 ASSESSMENT — EXTERNAL EXAM - RIGHT EYE: OD_EXAM: PTOSIS

## 2020-08-25 ASSESSMENT — EXTERNAL EXAM - LEFT EYE: OS_EXAM: PTOSIS

## 2020-08-25 NOTE — NURSING NOTE
Chief Complaints and History of Present Illnesses   Patient presents with     Diplopia Follow-Up     Chief Complaint(s) and History of Present Illness(es)     Diplopia Follow-Up               Comments     Shira Marsh is a 75 year old female with the following diagnoses:   1. Alternating exotropia   2. Subjective visual disturbance     No change in vision since the last visit.   No diplopia.     Julio C RIDER 8:56 AM August 25, 2020

## 2020-08-25 NOTE — Clinical Note
8/25/2020       RE: Shira Marsh  [address]  [facility] [address]     Dear Colleague,    Thank you for referring your patient, Shira Marsh, to the EYE CLINIC at [facility]. Please see a copy of my visit note below.           Assessment & Plan     Shira Marsh is a 75 year old female with the following diagnoses:   1. Intermittent exotropia, alternating    2. Subjective visual disturbance    3. Internuclear ophthalmoplegia, unspecified laterality       75 year old woman presented for follow up on her new onset ptosis and diplopia. She noticed that her double vision and ptosis started improving after she was discharged from the hospital. She currently does not complain of diplopia, in fact it was completely resolved when she left the hospital ( hospitalized for high BP)    MRI brain 7/12/2020  Impression:  1. No acute infarction.  2. Changes of moderately advanced chronic small vessel ischemic disease.  3. Scattered foci of susceptibility in the cerebral hemispheres, basal ganglia, midbrain, david and cerebellum, likely representing chronic  Microhemorrhages. 4. 8 mm heterogeneously enhancing right frontal dural based irregular lesion. This is indeterminate. After evaluation of noncontrast CT  appearance, arterial (CTA) and venous enhancement (MR postgd) patterns, and basic MR signal characteristics, the findings are not very typical of a meningioma. A dural based metastasis in differential  though, there is no malignancy history in clinical records. Short interval follow up can be considered in couple weeks to follow up this finding.   5. Head MRA demonstrates no definite aneurysm or stenosis of the major  intracranial arteries.  5. Neck MRA demonstrates patent major cervical arteries    Acetylcholine Block Hannah: 11  Acetylcholine modul Hannah:0  Acetylcholine Block Hannah: 0  B12 high  B1 very high    Ice test was negative last visit    PMH: HTN, arthritis     Visual  acuity 20/25 in the right eye and 20/30 in the left eye both eye.  No fatigability with prolonged upgaze.  Levator function 16 millimeters RIGHT eye and 17 millimeters left eye.  MRD1 0.5 millimeters RIGHT eye and 0 millimeters left eye.  She has Good saccades and pursuits.  Good orbicularis strength.  Anterior segment exam unremarkable both eyes. 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  Her misalignment has improved and her eye movements have improved consistent with this diagnosis.  Her ptosis remains the same as the photographs taken at her last visit indicating that this is likely longstanding.  Follow up in 2 months or sooner if her symptoms worsens                Attending Physician Attestation:  Complete documentation of historical and exam elements from today's encounter can be found in the full encounter summary report (not reduplicated in this progress note).  I personally obtained the chief complaint(s) and history of present illness.  I confirmed and edited as necessary the review of systems, past medical/surgical history, family history, social history, and examination findings as documented by others; and I examined the patient myself.  I personally reviewed the relevant tests, images, and reports as documented above.  I formulated and edited as necessary the assessment and plan and discussed the findings and management plan with the patient and family. I personally reviewed the ophthalmic test(s) associated with this encounter, agree with the interpretation(s) as documented by the resident/fellow, and have edited the corresponding report(s) as necessary.  - Tico Denson MD  Neuro-ophthalmology fellow  [facility]      Again, thank you for allowing me to participate in the care of your patient.      Sincerely,    Tico Camacho MD

## 2020-08-25 NOTE — LETTER
2020         RE:  :  MRN: Shira Marsh  1945  [id number]     Dear Dr. Schmitz:     Your patient, Shira Marsh, returned for neuro-ophthalmic follow up. My assessment and plan are below.  For further details, please see my attached clinic note.      Assessment & Plan     Shira Marsh is a 75 year old female with the following diagnoses:   1. Intermittent exotropia, alternating    2. Subjective visual disturbance    3. Internuclear ophthalmoplegia, unspecified laterality       75 year old woman presented for follow up on her new onset ptosis and diplopia. She noticed that her double vision and ptosis started improving after she was discharged from the hospital. She currently does not complain of diplopia, in fact it was completely resolved when she left the hospital ( hospitalized for high BP)    MRI brain 2020  Impression:  1. No acute infarction.  2. Changes of moderately advanced chronic small vessel ischemic disease.  3. Scattered foci of susceptibility in the cerebral hemispheres, basal ganglia, midbrain, david and cerebellum, likely representing chronic  Microhemorrhages. 4. 8 mm heterogeneously enhancing right frontal dural based irregular lesion. This is indeterminate. After evaluation of noncontrast CT  appearance, arterial (CTA) and venous enhancement (MR postgd) patterns, and basic MR signal characteristics, the findings are not very typical of a meningioma. A dural based metastasis in differential  though, there is no malignancy history in clinical records. Short interval follow up can be considered in couple weeks to follow up this finding.   5. Head MRA demonstrates no definite aneurysm or stenosis of the major  intracranial arteries.  5. Neck MRA demonstrates patent major cervical arteries    Acetylcholine Block Hannah: 11  Acetylcholine modul Hannah:0  Acetylcholine Block Hannah: 0  B12 high  B1 very high    Ice test was negative last visit    PMH: HTN, arthritis     Visual acuity 20/25  in the right eye and 20/30 in the left eye both eye.  No fatigability with prolonged upgaze.  Levator function 16 millimeters RIGHT eye and 17 millimeters left eye.  MRD1 0.5 millimeters RIGHT eye and 0 millimeters left eye.  She has Good saccades and pursuits.  Good orbicularis strength.  Anterior segment exam unremarkable both eyes. 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  Her misalignment has improved and her eye movements have improved consistent with this diagnosis.  Her ptosis remains the same as the photographs taken at her last visit indicating that this is likely longstanding.  Follow up in 2 months or sooner if her symptoms worsens    Again, thank you for allowing me to participate in the care of your patient.      Sincerely,    Tico Camacho MD  Professor  Ophthalmology Residency   Director of Neuro-Ophthalmology  [employer] [facility] [employer] Endowed Chair  Departments of Ophthalmology, Neurology, and Neurosurgery  [address]  [address]  T - [phone]   - [phone]  PAM [email or web address]    CC:  Alma Rosa Peace MD  [facility], [facility], [address], [address]  [facility] [address]  VIA In Reg Schmitz, FARAZ  VIA In Reg    DX = internuclear ophthalmoplegia

## 2020-08-26 ENCOUNTER — THERAPY VISIT (OUTPATIENT)
Dept: PHYSICAL THERAPY | Facility: CLINIC | Age: 75
End: 2020-08-26
Payer: COMMERCIAL

## 2020-08-26 DIAGNOSIS — M25.561 CHRONIC PAIN OF BOTH KNEES: Primary | ICD-10-CM

## 2020-08-26 DIAGNOSIS — M17.0 PRIMARY OSTEOARTHRITIS OF BOTH KNEES: ICD-10-CM

## 2020-08-26 DIAGNOSIS — M25.562 CHRONIC PAIN OF BOTH KNEES: Primary | ICD-10-CM

## 2020-08-26 DIAGNOSIS — G89.29 CHRONIC PAIN OF BOTH KNEES: Primary | ICD-10-CM

## 2020-08-26 PROCEDURE — 97110 THERAPEUTIC EXERCISES: CPT | Mod: GP | Performed by: PHYSICAL THERAPIST

## 2020-08-26 PROCEDURE — 97161 PT EVAL LOW COMPLEX 20 MIN: CPT | Mod: GP | Performed by: PHYSICAL THERAPIST

## 2020-08-26 NOTE — PROGRESS NOTES
"[facility] Initial Evaluation  SUBJECTIVE  Shira Marsh is a 75 year old female patient presenting to Physical Therapy with the following Primary Symptoms: Bilateral knee pain.     Red Flags (bold when present):  Catching and locking, buckling/giving-way, or pain at night.     History of symptoms: Has had a long standing history of knee pain. Over the past several months this pain has gradually gotten worse. Walks mostly with a FWW in the community due to pain and slowed gait speed. Worse in the AM. Points to the suprapatellar area as the sight of pain. Notes difficulty in getting to second level bathroom. These pains are described as constant. Pain is rated 5/10 at rest, and 8/10 at worst. Patient describes pain as aching and \"pain\".     Previous treatment has included Tylenol, rest, CSI a long time ago. Patient notes that prior treatment has resulted in very little relief. Poor response to CSI. Since onset, these pains are getting worse.    Current Symptoms are worsened by: walking, stairs, sit<>stand.     Current Symptoms are relieved by rest. Tylenol does not work anymore.    Patient states that current complaints are not affecting sleep pattern.     Recent surgical procedure: none.     Recent imaging studies have not been performed.     General health as reported by patient is: fair. Pertinent medical history: recent history of brainstem CVA. See chart fore more details on health history. She denies any significant current illness or recent hospital admissions. She denies any regonal implanted devices.     Current occupational status: Retired.     PATIENT GOALS: To reduce the pain to walk better.      OBJECTIVE  OBSERVATION: Patient is very deconditioned. SOB with ambulation x50 feet.     STATIC POSTURE: Sits and lies with knees in midrange knee flexion  -This patient was able to remain comfortably seated throughout exam.    GAIT: Uses FWW with significant UE assistance. Gait pattern " with significant side to side trunk lean and poor balance without assistive device.    MOTOR CONTROL:  -Quad set: Poor activation of quadriceps in isolation bilaterally, R worse than L. Max substitution of RF and hip flexors  -Straight leg raise: (L) 15 dg quad lag, (R) unable to complete SLR due to pain and poor motor control    EDEMA:  -Sweep test: Difficult to assess true effusion due to large soft tissue mass about the knee   -Severe suprapatellar pouch edema bilaterally      ROM:   Left  (AROM/PROM) Right  (AROM/PROM)   Flexion 95*/100* 100*/105*   Extension 5 short*/3 short* 0*/5 hyper*   Hyperextension     Hip IR     Hip ER     *Denotes pain    End Feels: Early firm end feel into flexion bilaterally.       Strength (MMT):   Left Right   Flexion 4/5* 4/5*   Extension 4/5* 3/5*   Hip IR     Hip ER     Hip abduction     *Denotes pain      PALPATION: Tender about the entire knee joint bilaterally, with most prominent TTP at the medial and lateral joint lines, as well as the superior pole and border of the patella.    PATELLAR MOBILITY: 1 quadrant medial/lateral bilaterally with significant pain upon patellar mobility assessment.      Therapist Assessment: Shira Marsh is a 75 year old female patient presenting to Physical Therapy with bilateral knee pain. Patient demonstrates reduced knee ROM, significant motor control deficits of the quadriceps, impaired gait, reduced knee strength, and poor activity tolerance. Signs and symptoms are consistent with end stage knee OA. Skilled PT services are necessary in order to reduce impairments and improve independent function.    Assessment/Plan:    Patient is a 75 year old female with both sides knee complaints.    Patient has the following significant findings with corresponding treatment plan.                Diagnosis 1:  B knee OA    Pain -  hot/cold therapy, manual therapy, splint/taping/bracing/orthotics, education and home program  Decreased ROM/flexibility -  manual therapy, therapeutic exercise and home program  Decreased joint mobility - manual therapy, therapeutic exercise and home program  Decreased strength - therapeutic exercise, therapeutic activities and home program  Impaired balance - neuro re-education, gait training, therapeutic activities, adaptive equipment/assistive device and home program  Edema - vasopneumatics, cold therapy and self management/home program  Impaired gait - gait training, assistive devices and home program  Impaired muscle performance - neuro re-education and home program  Decreased function - therapeutic activities and home program    Therapy Evaluation Codes:   1) History comprised of:   Personal factors that impact the plan of care:      Time since onset of symptoms.    Comorbidity factors that impact the plan of care are:      see chart.     Medications impacting care: see chart.  2) Examination of Body Systems comprised of:   Body structures and functions that impact the plan of care:      Knee.   Activity limitations that impact the plan of care are:      Bending, Driving, Lifting, Squatting/kneeling, Stairs, Standing and Walking.  3) Clinical presentation characteristics are:   Stable/Uncomplicated.  4) Decision-Making    Low complexity using standardized patient assessment instrument and/or measureable assessment of functional outcome.  Cumulative Therapy Evaluation is: Low complexity.    Previous and current functional limitations:  (See Goal Flow Sheet for this information)    Short term and Long term goals: (See Goal Flow Sheet for this information)     Communication ability:  Patient appears to be able to clearly communicate and understand verbal and written communication and follow directions correctly.  Treatment Explanation - The following has been discussed with the patient:   RX ordered/plan of care  Anticipated outcomes  Possible risks and side effects  This patient would benefit from PT intervention to resume normal  activities.   Rehab potential is guarded due to severity of symptoms, sedentary lifestyle, and lack of self-management strategies.    Frequency:  1 X week, once daily  Duration:  for 6-8 weeks  Discharge Plan:  Achieve all LTG.  Independent in home treatment program.  Reach maximal therapeutic benefit.    Please refer to the daily flowsheet for treatment today, total treatment time and time spent performing 1:1 timed codes.

## 2020-08-31 ENCOUNTER — ANCILLARY PROCEDURE (OUTPATIENT)
Dept: ULTRASOUND IMAGING | Facility: CLINIC | Age: 75
End: 2020-08-31
Attending: SURGERY
Payer: COMMERCIAL

## 2020-08-31 DIAGNOSIS — I16.1 HYPERTENSIVE EMERGENCY: ICD-10-CM

## 2020-09-01 ENCOUNTER — THERAPY VISIT (OUTPATIENT)
Dept: PHYSICAL THERAPY | Facility: CLINIC | Age: 75
End: 2020-09-01
Payer: COMMERCIAL

## 2020-09-01 DIAGNOSIS — M25.561 CHRONIC PAIN OF BOTH KNEES: ICD-10-CM

## 2020-09-01 DIAGNOSIS — G89.29 CHRONIC PAIN OF BOTH KNEES: ICD-10-CM

## 2020-09-01 DIAGNOSIS — M25.562 CHRONIC PAIN OF BOTH KNEES: ICD-10-CM

## 2020-09-01 PROCEDURE — 97140 MANUAL THERAPY 1/> REGIONS: CPT | Mod: GP | Performed by: PHYSICAL THERAPIST

## 2020-09-01 PROCEDURE — 97110 THERAPEUTIC EXERCISES: CPT | Mod: GP | Performed by: PHYSICAL THERAPIST

## 2020-09-01 PROCEDURE — 97530 THERAPEUTIC ACTIVITIES: CPT | Mod: GP | Performed by: PHYSICAL THERAPIST

## 2020-09-03 ENCOUNTER — OFFICE VISIT (OUTPATIENT)
Dept: FAMILY MEDICINE | Facility: CLINIC | Age: 75
End: 2020-09-03
Payer: COMMERCIAL

## 2020-09-03 VITALS
DIASTOLIC BLOOD PRESSURE: 69 MMHG | HEART RATE: 86 BPM | OXYGEN SATURATION: 97 % | SYSTOLIC BLOOD PRESSURE: 104 MMHG | TEMPERATURE: 98.5 F

## 2020-09-03 DIAGNOSIS — F32.89 OTHER DEPRESSION: ICD-10-CM

## 2020-09-03 DIAGNOSIS — I10 HYPERTENSION, UNSPECIFIED TYPE: Primary | ICD-10-CM

## 2020-09-03 DIAGNOSIS — I16.1 HYPERTENSIVE EMERGENCY: ICD-10-CM

## 2020-09-03 DIAGNOSIS — M25.562 CHRONIC PAIN OF BOTH KNEES: ICD-10-CM

## 2020-09-03 DIAGNOSIS — M25.561 CHRONIC PAIN OF BOTH KNEES: ICD-10-CM

## 2020-09-03 DIAGNOSIS — G89.29 CHRONIC PAIN OF BOTH KNEES: ICD-10-CM

## 2020-09-03 DIAGNOSIS — I70.1 LEFT RENAL ARTERY STENOSIS (H): ICD-10-CM

## 2020-09-03 LAB
BUN SERPL-MCNC: 8.1 MG/DL (ref 7–19)
CALCIUM SERPL-MCNC: 9.4 MG/DL (ref 8.5–10.1)
CHLORIDE SERPLBLD-SCNC: 104.3 MMOL/L (ref 98–110)
CO2 SERPL-SCNC: 25 MMOL/L (ref 20–32)
CREAT SERPL-MCNC: 0.8 MG/DL (ref 0.5–1)
GFR SERPL CREATININE-BSD FRML MDRD: 76.9 ML/MIN/1.7 M2
GLUCOSE SERPL-MCNC: 93.1 MG'DL (ref 70–99)
POTASSIUM SERPL-SCNC: 4 MMOL/L (ref 3.3–4.5)
SODIUM SERPL-SCNC: 139.7 MMOL/L (ref 132.6–141.4)

## 2020-09-03 RX ORDER — HYDRALAZINE HYDROCHLORIDE 50 MG/1
25 TABLET, FILM COATED ORAL 3 TIMES DAILY
Qty: 120 TABLET | Refills: 1 | COMMUNITY
Start: 2020-09-03 | End: 2020-09-22

## 2020-09-03 NOTE — LETTER
September 3, 2020      Shira Marsh  [address]  [facility] [address]        Dear Shira,    Thank you for getting your care at [facility]. Your kidney and electrolyte blood tests looked good today. We will look forward to seeing you back in clinic to check blood pressures in a couple of weeks.     Resulted Orders   Basic Metabolic Panel ([facility])   Result Value Ref Range    Calcium 9.4 8.5 - 10.1 mg/dL    Chloride 104.3 98.0 - 110.0 mmol/L    Carbon Dioxide 25.0 20.0 - 32.0 mmol/L    Creatinine 0.8 0.5 - 1.0 mg/dL    Glucose 93.1 70.0 - 99.0 mg'dL    Potassium 4.0 3.3 - 4.5 mmol/L    Sodium 139.7 132.6 - 141.4 mmol/L    GFR Estimate 76.9 >60.0 mL/min/1.7 m2    GFR Estimate If Black >90 >60.0 mL/min/1.7 m2    Urea Nitrogen 8.1 7.0 - 19.0 mg/dL       If you have any questions, please call the clinic to make an appointment with me for a clinic visit.    Sincerely,    Alma Rosa Peace MD

## 2020-09-03 NOTE — PROGRESS NOTES
Shira is a 75 year old  who presents for   Patient presents with:  Knee Pain  Hypertension      Assessment and Plan      Shira was seen today for knee pain and hypertension.    Diagnoses and all orders for this visit:    Hypertensive emergency  Left renal artery stenosis (H)  Hypertension, unspecified type  Blood pressures actually lower today then would prefer for this elderly patient on multiple antihypertensives.  Renal artery ultrasound from 8/31/2020 shows no current evidence of renal artery stenosis, resolution of previously elevated velocity at the left renal artery.  BMP WNL today.  Will begin tapering down hydralazine: We will decrease the dose by half and decreased total daily dosing new regimen for HydroVal will be 25 mg 3 times daily.  No other changes to antihypertensive medications.  RTC in 2 weeks for a blood pressure check, if persistently 130/80 will consider stopping hydralazine.  -     Basic Metabolic Panel ([facility])  -     Decrease hydralazine to 25mg TID    Chronic pain of both knees  Pain is somewhat improved after 2 PT sessions.  Patient not interested in injections or surgery.  Will continue with therapy and continue to monitor.    Other depression  Patient's mood stable.  Significant stressors this year including serious health problems, COVID-19, and racial injustices and uprising's in [place].  Not currently interested in counseling, but will continue to discuss at each visit.  May benefit in the future from behavioral health home.       Return in about 2 weeks (around 9/17/2020) for BP Recheck.    Options for treatment and follow-up care were reviewed with the patient. Shira Marsh  engaged in the decision making process and verbalized understanding of the options discussed and agreed with the final plan.    Alma Rosa Peace MD         HPI       Shira is a 75 year old  who presents for   Patient presents with:  Knee Pain  Hypertension      Visit [place]  1. HTN: no problems  with medicines. Taking as prescribed. Occasional mild headaches, no lightheadedness. No chest pain, occasional L shoulder pain that radiates to the pectoralis muscle. Happens rarely, not with activity. Usulaly happens when she's laying in bed on the L shoulder.     2. Knee pain: painful every day. Has had two PT sessions. Still has pain with exercises and walking, but slightly better with PT. Capsaicin cream helping.     3. Follow-ups: has seen ophthalmology, resolved diplopia per note. Had renal artery uls, reports that her stenosis was resolved. Seeing ophtho again in early October, 2020.     4. Mood: Ok. Been a long year. She feels supported by daughter. Not interested in counseling at this time.     Patient is an established patient of this clinic..  Patient Active Problem List   Diagnosis     Pain in joint, lower leg     Depression     HTN (hypertension)     Chronic pain     DJD (degenerative joint disease)     Elevated TSH     DDD (degenerative disc disease)     Hypertensive emergency     Left renal artery stenosis (H)     Brainstem stroke (H)     Osteoarthritis of left patellofemoral joint     Chronic pain of both knees     Past Surgical History:   Procedure Laterality Date     APPENDECTOMY       GYN SURGERY      Hysterectomy     GYN SURGERY      Tubal ligation       Social History     Tobacco Use     Smoking status: Never Smoker     Smokeless tobacco: Never Used   Substance Use Topics     Alcohol use: No     Family History   Problem Relation Age of Onset     Stomach Cancer Father      Hypertension Mother          Reviewed and updated as needed this visit by clinical staff  Allergies       Reviewed and updated as needed this visit by Provider  Allergies         Health Maintenance Due   Topic Date Due     DEXA  1945     HEPATITIS C SCREENING  1945     ADVANCE CARE PLANNING  1945     DEPRESSION ACTION PLAN  1945     MAMMO SCREENING  1945     COLORECTAL CANCER SCREENING   05/16/1957     DTAP/TDAP/TD IMMUNIZATION (1 - Tdap) 05/16/1972     ZOSTER IMMUNIZATION (1 of 2) 05/16/1997     MEDICARE ANNUAL WELLNESS VISIT  05/16/2012     FALL RISK ASSESSMENT  05/16/2012     PNEUMOCOCCAL IMMUNIZATION 65+ LOW/MEDIUM RISK (1 of 2 - PCV13) 05/16/2012     INFLUENZA VACCINE (1) 09/01/2020          Review of Systems:   Review of Systems  All ROS was negative except those noted in HPI       Physical Exam:     Vitals:    09/03/20 0854   BP: 104/69   BP Location: Left arm   Cuff Size: Adult Large   Pulse: 86   Temp: 98.5  F (36.9  C)   SpO2: 97%     There is no height or weight on file to calculate BMI.  Vitals were reviewed and were normal  Physical Exam   GEN: Alert, oriented person in NAD  HENT: normocephalic, atraumatic  NECK: full ROM  RESPIRATORY:CTAB, no respiratory distress, no extra WOB, speaking in full sentences, satting well on RA  CVS: RRR, nl S1/S2, no murmurs no gallups. WWP. Trace bilateral pitting edema of lower extremities.  MSK: full ROM, no obvious peripheral edema. Normal gait.  NEURO: no FND.       Results:      Results from this visit  Results for orders placed or performed in visit on 09/03/20   Basic Metabolic Panel ([facility])     Status: None   Result Value Ref Range    Calcium 9.4 8.5 - 10.1 mg/dL    Chloride 104.3 98.0 - 110.0 mmol/L    Carbon Dioxide 25.0 20.0 - 32.0 mmol/L    Creatinine 0.8 0.5 - 1.0 mg/dL    Glucose 93.1 70.0 - 99.0 mg'dL    Potassium 4.0 3.3 - 4.5 mmol/L    Sodium 139.7 132.6 - 141.4 mmol/L    GFR Estimate 76.9 >60.0 mL/min/1.7 m2    GFR Estimate If Black >90 >60.0 mL/min/1.7 m2    Urea Nitrogen 8.1 7.0 - 19.0 mg/dL       [facility]

## 2020-09-03 NOTE — PATIENT INSTRUCTIONS
Here is the plan from today's visit      1. Hypertension  Blood pressures looking MUCH better, but almost a little low. We will start decreasing your hydralazine today and recheck your pressures in a couple of weeks.   - hydrALAZINE (APRESOLINE) 50 MG tablet; Take 0.5 tablets (25 mg) by mouth 3 times daily  Dispense: 120 tablet; Refill: 1      Thank you for coming to [facility] today.  Lab Testing:  **If you had lab testing today and your results are reassuring or normal they will be mailed to you or sent through [employer] within 7 days.   **If the lab tests need quick action we will call you with the results.  The phone number we will call with results is # [phone] (home) . If this is not the best number please call our clinic and change the number.  Medication Refills:  If you need any refills please call your pharmacy and they will contact us.   If you need to  your refill at a new pharmacy, please contact the new pharmacy directly. The new pharmacy will help you get your medications transferred faster.   Scheduling:  If you have any concerns about today's visit or wish to schedule another appointment please call our office during normal business hours [phone] (8-5:00 M-F)  If a referral was made to a [facility] and you don't get a call from central scheduling please call [phone].  If a Mammogram was ordered for you at [facility] call [phone] to schedule or change your appointment.  If you had an XRay/CT/Ultrasound/MRI ordered the number is [phone] to schedule or change your radiology appointment.   Medical Concerns:  If you have urgent medical concerns please call [phone] at any time of the day.    Alma Rosa Peace MD

## 2020-09-08 ENCOUNTER — THERAPY VISIT (OUTPATIENT)
Dept: PHYSICAL THERAPY | Facility: CLINIC | Age: 75
End: 2020-09-08
Payer: COMMERCIAL

## 2020-09-08 DIAGNOSIS — M25.561 CHRONIC PAIN OF BOTH KNEES: ICD-10-CM

## 2020-09-08 DIAGNOSIS — M25.562 CHRONIC PAIN OF BOTH KNEES: ICD-10-CM

## 2020-09-08 DIAGNOSIS — G89.29 CHRONIC PAIN OF BOTH KNEES: ICD-10-CM

## 2020-09-08 PROCEDURE — 97110 THERAPEUTIC EXERCISES: CPT | Mod: GP | Performed by: PHYSICAL THERAPIST

## 2020-09-08 PROCEDURE — 97140 MANUAL THERAPY 1/> REGIONS: CPT | Mod: GP | Performed by: PHYSICAL THERAPIST

## 2020-09-14 ENCOUNTER — TELEPHONE (OUTPATIENT)
Dept: FAMILY MEDICINE | Facility: CLINIC | Age: 75
End: 2020-09-14

## 2020-09-14 DIAGNOSIS — I16.1 HYPERTENSIVE EMERGENCY: ICD-10-CM

## 2020-09-14 NOTE — TELEPHONE ENCOUNTER
Verify that the refill encounter hasn't been started Yes    [facility] phone call message- patient requesting a refill:    Full Medication Name: amLODIPine (NORVASC) 10 MG tablet, aspirin (ASA) 81 MG chewable tablet, losartan (COZAAR) 50 MG tablet, atorvastatin (LIPITOR) 40 MG tablet      Dose: See Chart     Pharmacy confirmed as   [place], [place] - [address]  [address]  [address]  [facility] [address]  Phone: [phone] Fax: [phone] Alternate Fax: [phone], [phone], [phone]  : Yes    Medication tab checked to see if medication has been sent  Yes    Additional Comments: Patient states she was told to call us for refills and she is completely out of them all.     OK to leave a message on voice mail? Yes    Advised patient refill may take up to 2 business days? Yes    Primary language: English      needed? No    Call taken on September 14, 2020 at 3:32 PM by April Mercedes    Route to [facility] MED REFILL

## 2020-09-15 RX ORDER — LOSARTAN POTASSIUM 50 MG/1
50 TABLET ORAL DAILY
Qty: 90 TABLET | Refills: 1 | Status: SHIPPED | OUTPATIENT
Start: 2020-09-15 | End: 2020-09-17

## 2020-09-15 RX ORDER — ASPIRIN 81 MG/1
81 TABLET, CHEWABLE ORAL DAILY
Qty: 90 TABLET | Refills: 1 | Status: SHIPPED | OUTPATIENT
Start: 2020-09-15 | End: 2020-09-17

## 2020-09-15 RX ORDER — AMLODIPINE BESYLATE 10 MG/1
10 TABLET ORAL DAILY
Qty: 90 TABLET | Refills: 1 | Status: SHIPPED | OUTPATIENT
Start: 2020-09-15 | End: 2020-09-17

## 2020-09-15 RX ORDER — ATORVASTATIN CALCIUM 40 MG/1
40 TABLET, FILM COATED ORAL EVERY EVENING
Qty: 90 TABLET | Refills: 1 | Status: SHIPPED | OUTPATIENT
Start: 2020-09-15 | End: 2020-09-17

## 2020-09-17 ENCOUNTER — THERAPY VISIT (OUTPATIENT)
Dept: PHYSICAL THERAPY | Facility: CLINIC | Age: 75
End: 2020-09-17
Payer: COMMERCIAL

## 2020-09-17 ENCOUNTER — OFFICE VISIT (OUTPATIENT)
Dept: FAMILY MEDICINE | Facility: CLINIC | Age: 75
End: 2020-09-17
Payer: COMMERCIAL

## 2020-09-17 VITALS
SYSTOLIC BLOOD PRESSURE: 146 MMHG | RESPIRATION RATE: 18 BRPM | TEMPERATURE: 98.3 F | DIASTOLIC BLOOD PRESSURE: 80 MMHG | HEART RATE: 92 BPM | WEIGHT: 241 LBS | OXYGEN SATURATION: 98 % | BODY MASS INDEX: 42.7 KG/M2 | HEIGHT: 63 IN

## 2020-09-17 DIAGNOSIS — M25.561 CHRONIC PAIN OF BOTH KNEES: ICD-10-CM

## 2020-09-17 DIAGNOSIS — G89.29 CHRONIC PAIN OF BOTH KNEES: ICD-10-CM

## 2020-09-17 DIAGNOSIS — M25.562 CHRONIC PAIN OF BOTH KNEES: ICD-10-CM

## 2020-09-17 DIAGNOSIS — I16.1 HYPERTENSIVE EMERGENCY: Primary | ICD-10-CM

## 2020-09-17 PROCEDURE — 97530 THERAPEUTIC ACTIVITIES: CPT | Mod: GP | Performed by: PHYSICAL THERAPIST

## 2020-09-17 PROCEDURE — 97110 THERAPEUTIC EXERCISES: CPT | Mod: GP | Performed by: PHYSICAL THERAPIST

## 2020-09-17 RX ORDER — AMLODIPINE BESYLATE 10 MG/1
10 TABLET ORAL DAILY
Qty: 90 TABLET | Refills: 1 | Status: SHIPPED | OUTPATIENT
Start: 2020-09-17 | End: 2021-06-02

## 2020-09-17 RX ORDER — ASPIRIN 81 MG/1
81 TABLET, CHEWABLE ORAL DAILY
Qty: 90 TABLET | Refills: 1 | Status: SHIPPED | OUTPATIENT
Start: 2020-09-17 | End: 2021-06-02

## 2020-09-17 RX ORDER — ATORVASTATIN CALCIUM 40 MG/1
40 TABLET, FILM COATED ORAL EVERY EVENING
Qty: 90 TABLET | Refills: 1 | Status: SHIPPED | OUTPATIENT
Start: 2020-09-17 | End: 2021-06-02

## 2020-09-17 RX ORDER — LOSARTAN POTASSIUM 50 MG/1
50 TABLET ORAL DAILY
Qty: 90 TABLET | Refills: 1 | Status: SHIPPED | OUTPATIENT
Start: 2020-09-17 | End: 2021-06-02

## 2020-09-17 RX ORDER — LOSARTAN POTASSIUM 50 MG/1
75 TABLET ORAL DAILY
Qty: 90 TABLET | Refills: 1 | Status: SHIPPED | OUTPATIENT
Start: 2020-09-17 | End: 2020-09-17

## 2020-09-17 RX ORDER — LOSARTAN POTASSIUM 50 MG/1
50 TABLET ORAL DAILY
Qty: 90 TABLET | Refills: 1 | Status: SHIPPED | OUTPATIENT
Start: 2020-09-17 | End: 2020-09-17

## 2020-09-17 ASSESSMENT — PATIENT HEALTH QUESTIONNAIRE - PHQ9: SUM OF ALL RESPONSES TO PHQ QUESTIONS 1-9: 15

## 2020-09-17 ASSESSMENT — MIFFLIN-ST. JEOR: SCORE: 1567.3

## 2020-09-17 NOTE — PROGRESS NOTES
Preceptor Attestation:   Patient seen, evaluated and discussed with the resident. I have verified the content of the note, which accurately reflects my assessment of the patient and the plan of care.   Supervising Physician:  Regina Smions MD

## 2020-09-17 NOTE — PROGRESS NOTES
Shira is a 75 year old  who presents for   Patient presents with:  RECHECK: Blood pressure- Not checking BP at home for x1week  Refill Request: Amlodipin,Aspirin and Losartan      Assessment and Plan      Shira was seen today for recheck and refill request.    Diagnoses and all orders for this visit:    History of hypertensive emergency  Medication refill issue  Pressures 140s/80s today on hydralazine only. Refilled amlodipine and losartan. Initially advised patient to restart other antihypertensives today, but to ensure that her pressures do not get too low, will have RN call to have patient hold hydralazine and only resume amlodipine and losartan and plan to come back in ~1 week for BP recheck with physician or MD. If pressures maintained at 140s/80s off of hydralazine, will plan to discontinue this medication. If elevated, may need to restart hydralazine.   -     amLODIPine (NORVASC) 10 MG tablet; Take 1 tablet (10 mg) by mouth daily  -     aspirin (ASA) 81 MG chewable tablet; Take 1 tablet (81 mg) by mouth daily  -     losartan (COZAAR) 50 MG tablet; Take 1 tablet (50 mg) by mouth daily  -     atorvastatin (LIPITOR) 40 MG tablet; Take 1 tablet (40 mg) by mouth every evening    Chronic pain of both knees  Finished PT today. Congratulated patient on her hard work. Recommended continuing her exercises at home and being active as able with the walker, which is helping her stay mobile. Advised that if her pain increases to let me know so that we could talk about other options for management. Patient not interested in injections or surgery, so will maintain conservative management.          Return in about 1 week (around 9/24/2020) for BP Recheck.    Options for treatment and follow-up care were reviewed with the patient. Shira Marsh  engaged in the decision making process and verbalized understanding of the options discussed and agreed with the final plan.    Alma Rosa Peace MD         HPI       Shira is  "a 75 year old  who presents for   Patient presents with:  RECHECK: Blood pressure- Not checking BP at home for x1week  Refill Request: Amlodipin,Aspirin and Losartan      Visit [place]  1. BP check: Out of losartan and amlodipine x1 week, has been taking hydralazine TID only. Mild, occasional headaches, nothing like during her CVA earlier this summer. No chest pain, SOB, vision changes. Leg swelling is mild and improving.     2. Chronic knee pain: Last PT session this morning. Thinks it was helpful. 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 Still not interested in knee injections (bad experience in the past).       Patient is an established patient of this clinic..  Patient Active Problem List   Diagnosis     Pain in joint, lower leg     Depression     HTN (hypertension)     Chronic pain     Elevated TSH     DDD (degenerative disc disease)     Hypertensive emergency     Left renal artery stenosis (H)     Brainstem stroke (H)     Osteoarthritis of left patellofemoral joint     Past Surgical History:   Procedure Laterality Date     APPENDECTOMY       GYN SURGERY      Hysterectomy     GYN SURGERY      Tubal ligation       Social History     Tobacco Use     Smoking status: Never Smoker     Smokeless tobacco: Never Used   Substance Use Topics     Alcohol use: No     Family History   Problem Relation Age of Onset     Stomach Cancer Father      Hypertension Mother          Reviewed and updated as needed this visit by clinical staff  Tobacco  Allergies  Meds  Med Hx  Surg Hx  Fam Hx  Soc Hx      Reviewed and updated as needed this visit by Provider         Health Maintenance Due   Topic Date Due     DEXA  1945     HEPATITIS C SCREENING  1945     ADVANCE CARE PLANNING  1945     DEPRESSION ACTION PLAN  1945     MAMMO SCREENING  1945     COLORECTAL CANCER SCREENING  05/16/1957     DTAP/TDAP/TD " "IMMUNIZATION (1 - Tdap) 05/16/1972     ZOSTER IMMUNIZATION (1 of 2) 05/16/1997     MEDICARE ANNUAL WELLNESS VISIT  05/16/2012     FALL RISK ASSESSMENT  05/16/2012     PNEUMOCOCCAL IMMUNIZATION 65+ LOW/MEDIUM RISK (1 of 2 - PCV13) 05/16/2012     INFLUENZA VACCINE (1) 09/01/2020            Review of Systems:   Review of Systems  All ROS was negative except those noted in HPI       Physical Exam:     Vitals:    09/17/20 0825 09/17/20 0828   BP: (!) 149/81 (!) 146/80   Pulse: 92    Resp: 18    Temp: 98.3  F (36.8  C)    TempSrc: Oral    SpO2: 98%    Weight: 109.3 kg (241 lb)    Height: 1.6 m (5' 3\")      Body mass index is 42.69 kg/m .   Vital signs normal, 3rd BP check 145/81.   Physical Exam   GEN: Alert, oriented person in NAD  HENT: normocephalic, atraumatic  NECK: full ROM  RESPIRATORY: CTAB, saturating well on RA, no respiratory distress, no extra WOB, speaking in full sentences  CVS: RRR, nl S1/S2, no murmurs, no extra sounds, trace bilateral pitting edema of lower extremities to ankles, WWP  NEURO: no FND.       Results:   No testing ordered today    Alma Rosa Peace MD  [facility]           "

## 2020-09-17 NOTE — PATIENT INSTRUCTIONS
Here is the plan from today's visit    1. High blood pressures  Keeping all of your medications the same. You are doing GREAT!  - amLODIPine (NORVASC) 10 MG tablet; Take 1 tablet (10 mg) by mouth daily  Dispense: 90 tablet; Refill: 1  - aspirin (ASA) 81 MG chewable tablet; Take 1 tablet (81 mg) by mouth daily  Dispense: 90 tablet; Refill: 1  - losartan (COZAAR) 50 MG tablet; Take 1 tablet (50 mg) by mouth daily  Dispense: 90 tablet; Refill: 1  - atorvastatin (LIPITOR) 40 MG tablet; Take 1 tablet (40 mg) by mouth every evening  Dispense: 90 tablet; Refill: 1      2. Knee pain  Keep doing your exercises at home, doing laps with the walker, and keeping yourself moving when you can. Elevate your legs at night when you're resting to help with swelling.     Please call or return to clinic if your symptoms don't go away.    Fo    Thank you for coming to [facility] today.  Lab Testing:  **If you had lab testing today and your results are reassuring or normal they will be mailed to you or sent through [employer] within 7 days.   **If the lab tests need quick action we will call you with the results.  The phone number we will call with results is # [phone] (home) . If this is not the best number please call our clinic and change the number.  Medication Refills:  If you need any refills please call your pharmacy and they will contact us.   If you need to  your refill at a new pharmacy, please contact the new pharmacy directly. The new pharmacy will help you get your medications transferred faster.   Scheduling:  If you have any concerns about today's visit or wish to schedule another appointment please call our office during normal business hours [phone] (8-5:00 M-F)  If a referral was made to a [facility] and you don't get a call from central scheduling please call [phone].  If a Mammogram was ordered for you at [facility] call [phone] to schedule or change your  appointment.  If you had an XRay/CT/Ultrasound/MRI ordered the number is [phone] to schedule or change your radiology appointment.   Medical Concerns:  If you have urgent medical concerns please call [phone] at any time of the day.    Alma Rosa Peace MD

## 2020-09-22 ENCOUNTER — OFFICE VISIT (OUTPATIENT)
Dept: FAMILY MEDICINE | Facility: CLINIC | Age: 75
End: 2020-09-22
Payer: COMMERCIAL

## 2020-09-22 VITALS
TEMPERATURE: 98.5 F | HEART RATE: 85 BPM | DIASTOLIC BLOOD PRESSURE: 82 MMHG | WEIGHT: 238.8 LBS | BODY MASS INDEX: 42.3 KG/M2 | OXYGEN SATURATION: 95 % | SYSTOLIC BLOOD PRESSURE: 138 MMHG | RESPIRATION RATE: 18 BRPM

## 2020-09-22 DIAGNOSIS — I10 HYPERTENSION, UNSPECIFIED TYPE: Primary | ICD-10-CM

## 2020-09-22 NOTE — Clinical Note
: please call this patient to have her come in for blood pressure recheck in late October. Ideally would see me, but can be scheduled with another provider if necessary. Thank you!

## 2020-09-22 NOTE — PROGRESS NOTES
Shira is a 75 year old  who presents for   Patient presents with:  RECHECK: blood pressure       Assessment and Plan      Shira was seen today for recheck.    Diagnoses and all orders for this visit:    Hypertension, unspecified type  Blood pressures at goal of systolics <140 on losartan and amlodipine alone. Will formally stop hydralazine (had patient hold them starting one week prior to this visit). Plan for BP recheck in one month to ensure continued control.        Return in about 4 weeks (around 10/20/2020) for Blood pressure follow up.    Options for treatment and follow-up care were reviewed with the patient. Shira Marsh  engaged in the decision making process and verbalized understanding of the options discussed and agreed with the final plan.    Alma Rosa Peace MD         HPI       Shira is a 75 year old  who presents for   Patient presents with:  RECHECK: blood pressure       Visit [place]  1. BP recheck: No concerns today. Has been holding hydralazine since last week, taking losartan and amlodipine as directed. No new headaches, no chest pain, no SOB.       Patient is an established patient of this clinic..  Patient Active Problem List   Diagnosis     Pain in joint, lower leg     Depression     HTN (hypertension)     Chronic pain     Elevated TSH     DDD (degenerative disc disease)     Hypertensive emergency     Left renal artery stenosis (H)     Brainstem stroke (H)     Osteoarthritis of left patellofemoral joint     Past Surgical History:   Procedure Laterality Date     APPENDECTOMY       GYN SURGERY      Hysterectomy     GYN SURGERY      Tubal ligation       Social History     Tobacco Use     Smoking status: Never Smoker     Smokeless tobacco: Never Used   Substance Use Topics     Alcohol use: No     Family History   Problem Relation Age of Onset     Stomach Cancer Father      Hypertension Mother          Reviewed and updated as needed this visit by clinical staff  Tobacco  Allergies  Meds   Med Hx  Surg Hx  Fam Hx  Soc Hx      Reviewed and updated as needed this visit by Provider         Health Maintenance Due   Topic Date Due     DEXA  1945     HEPATITIS C SCREENING  1945     ADVANCE CARE PLANNING  1945     DEPRESSION ACTION PLAN  1945     MAMMO SCREENING  1945     COLORECTAL CANCER SCREENING  05/16/1957     DTAP/TDAP/TD IMMUNIZATION (1 - Tdap) 05/16/1972     ZOSTER IMMUNIZATION (1 of 2) 05/16/1997     MEDICARE ANNUAL WELLNESS VISIT  05/16/2012     FALL RISK ASSESSMENT  05/16/2012     PNEUMOCOCCAL IMMUNIZATION 65+ LOW/MEDIUM RISK (1 of 2 - PCV13) 05/16/2012     INFLUENZA VACCINE (1) 09/01/2020            Review of Systems:   Review of Systems  All ROS was negative except those noted in HPI       Physical Exam:     Vitals:    09/22/20 1129   BP: 138/82   Pulse: 85   Resp: 18   Temp: 98.5  F (36.9  C)   TempSrc: Oral   SpO2: 95%   Weight: 108.3 kg (238 lb 12.8 oz)     Body mass index is 42.3 kg/m .  Vitals were reviewed and were normal  Physical Exam   GEN: Alert, oriented person in NAD  HENT: normocephalic, atraumatic  NECK: full ROM  RESPIRATORY: no respiratory distress, no extra WOB, speaking in full sentences  CVS: regular rate, WWP  MSK: full ROM, no obvious peripheral edema. Normal gait.  NEURO: no FND.       Results:   No testing ordered today    Alma Rosa Peace MD  [facility]

## 2020-09-28 NOTE — PROGRESS NOTES
Preceptor Attestation:   Patient seen, evaluated and discussed with the resident. I have verified the content of the note, which accurately reflects my assessment of the patient and the plan of care.   Supervising Physician:  Farhad De La O MD

## 2020-10-27 DIAGNOSIS — H51.20 INTERNUCLEAR OPHTHALMOPLEGIA, UNSPECIFIED LATERALITY: Primary | ICD-10-CM

## 2020-10-27 DIAGNOSIS — H53.10 SUBJECTIVE VISUAL DISTURBANCE: ICD-10-CM

## 2021-06-01 DIAGNOSIS — I16.1 HYPERTENSIVE EMERGENCY: ICD-10-CM

## 2021-06-02 ENCOUNTER — DOCUMENTATION ONLY (OUTPATIENT)
Dept: FAMILY MEDICINE | Facility: CLINIC | Age: 76
End: 2021-06-02

## 2021-06-02 RX ORDER — LOSARTAN POTASSIUM 50 MG/1
50 TABLET ORAL DAILY
Qty: 90 TABLET | Refills: 1 | Status: SHIPPED | OUTPATIENT
Start: 2021-06-02

## 2021-06-02 RX ORDER — AMLODIPINE BESYLATE 10 MG/1
10 TABLET ORAL DAILY
Qty: 90 TABLET | Refills: 1 | Status: SHIPPED | OUTPATIENT
Start: 2021-06-02

## 2021-06-02 RX ORDER — ATORVASTATIN CALCIUM 40 MG/1
40 TABLET, FILM COATED ORAL EVERY EVENING
Qty: 90 TABLET | Refills: 1 | Status: SHIPPED | OUTPATIENT
Start: 2021-06-02

## 2021-06-02 RX ORDER — ASPIRIN 81 MG/1
81 TABLET, CHEWABLE ORAL DAILY
Qty: 90 TABLET | Refills: 1 | Status: SHIPPED | OUTPATIENT
Start: 2021-06-02

## 2021-06-02 NOTE — PROGRESS NOTES
"When opening a documentation only encounter, be sure to enter in \"Chief Complaint\" Forms and in \" Comments\" Title of form, description if needed.    Shira is a 76 year old  female  Form received via: Fax  Form now resides in: Provider Ready    Angi Negrete MA     Form has been completed by provider.     Form sent out via: Patient being seen by Doctor Yasmine on 06/15/2021 to fill out form  Patient informed: Yes  Output date: June 7, 2021    Angi Negrete MA      **Please close the encounter**                                          "

## 2021-06-23 ENCOUNTER — DOCUMENTATION ONLY (OUTPATIENT)
Dept: FAMILY MEDICINE | Facility: CLINIC | Age: 76
End: 2021-06-23

## 2021-06-23 NOTE — PROGRESS NOTES
"When opening a documentation only encounter, be sure to enter in \"Chief Complaint\" Forms and in \" Comments\" Title of form, description if needed.    Shira is a 76 year old  female  Form received via: Fax  Form now resides in: Provider Ready    Angi Negrete MA     Form has been completed by provider.     Form sent out via: Placed in  folder to make an apt.  Patient informed: Yes  Output date: June 29, 2021    Angi Negrete MA      **Please close the encounter**                          "

## 2021-07-02 ENCOUNTER — DOCUMENTATION ONLY (OUTPATIENT)
Dept: FAMILY MEDICINE | Facility: CLINIC | Age: 76
End: 2021-07-02

## 2021-07-02 NOTE — PROGRESS NOTES
"When opening a documentation only encounter, be sure to enter in \"Chief Complaint\" Forms and in \" Comments\" Title of form, description if needed.    Shira is a 76 year old  female  Form received via: Fax  Form now resides in: Provider Ready    Angi Negrete MA    Form has been completed by provider.     Form sent out via: Fax to Patient Request at Fax Number: [phone]  Patient informed: No, Reason:n/a  Output date: July 8, 2021    Angi Negrete MA      **Please close the encounter**                    "

## 2021-07-15 ENCOUNTER — DOCUMENTATION ONLY (OUTPATIENT)
Dept: FAMILY MEDICINE | Facility: CLINIC | Age: 76
End: 2021-07-15

## 2021-07-15 NOTE — PROGRESS NOTES
"When opening a documentation only encounter, be sure to enter in \"Chief Complaint\" Forms and in \" Comments\" Title of form, description if needed.    Shira is a 76 year old  female  Form received via: Fax  Form now resides in: Provider Ready    Beverly Camacho MA    Form has been completed by provider.     Form sent out via: [facility] to [facility] [employer] at Fax Number: [phone]  Patient informed: No, Reason:n/a  Output date: July 21, 2021    Angi Negrete MA      **Please close the encounter**                  "

## 2021-10-21 ENCOUNTER — DOCUMENTATION ONLY (OUTPATIENT)
Dept: FAMILY MEDICINE | Facility: CLINIC | Age: 76
End: 2021-10-21

## 2021-10-21 NOTE — PROGRESS NOTES
"When opening a documentation only encounter, be sure to enter in \"Chief Complaint\" Forms and in \" Comments\" Title of form, description if needed.    Shira is a 76 year old  female  Form received via: Fax  Form now resides in: Provider Chelsea Reyes CMA      Form has been completed by provider.     Form sent out via: Fax to [facility]  at Fax Number: [phone]  Patient informed: n/a  Output date: November 11, 2021    Chrissy Ceron CMA      **Please close the encounter**                "

## 2021-12-12 ENCOUNTER — NURSE TRIAGE (OUTPATIENT)
Dept: NURSING | Facility: CLINIC | Age: 76
End: 2021-12-12

## 2021-12-13 NOTE — TELEPHONE ENCOUNTER
Isabella called stating she is pt is sneezing since yesterday only when she coughs. She also reported each time pt sneezes she has running nose. She wants to know if this is coivd 19 symptoms?      She was told RN don't know, it could be common cold, allergies or covid. She stated pt is not with her and was informed to call back when she is with the pt for triage. She stated okay.      Amilcar Glass RN  [facility]          .

## 2022-01-18 ENCOUNTER — VIRTUAL VISIT (OUTPATIENT)
Dept: FAMILY MEDICINE | Facility: CLINIC | Age: 77
End: 2022-01-18

## 2022-01-18 ENCOUNTER — NURSE TRIAGE (OUTPATIENT)
Dept: NURSING | Facility: CLINIC | Age: 77
End: 2022-01-18

## 2022-01-18 DIAGNOSIS — J06.9 UPPER RESPIRATORY TRACT INFECTION, UNSPECIFIED TYPE: Primary | ICD-10-CM

## 2022-01-18 PROCEDURE — 99212 OFFICE O/P EST SF 10 MIN: CPT | Mod: 95 | Performed by: FAMILY MEDICINE

## 2022-01-18 NOTE — PROGRESS NOTES
Shira is a 76 year old who is being evaluated via a billable telephone visit.      What phone number would you like to be contacted at? cell  How would you like to obtain your AVS? MyChart    Assessment & Plan     Upper respiratory tract infection, unspecified type  Resolving  Follow up only if unimproved.   I urged her to get a covid vaccination, she declined             See Patient Instructions    No follow-ups on file.    Clint Rosas MD  [facility]    Subjective   Shira is a 76 year old who presents for the following health issues     HPI     Acute Illness  Acute illness concerns: coough, fatigue  Onset/Duration: 2 weeks  Symptoms:  Fever: no  Chills/Sweats: no  Headache (location?): no  Sinus Pressure: no  Conjunctivitis:  no  Ear Pain: no  Rhinorrhea: YES  Congestion: YES  Sore Throat: YES  Cough: YES-non-productive  Wheeze: no  Decreased Appetite: YES  Nausea: no  Vomiting: no  Diarrhea: no  Dysuria/Freq.: no  Dysuria or Hematuria: no  Fatigue/Achiness: YES  Sick/Strep Exposure: YES- covid family  Therapies tried and outcome: None      Review of Systems   Constitutional, HEENT, cardiovascular, pulmonary, gi and gu systems are negative, except as otherwise noted.      Objective           Vitals:  No vitals were obtained today due to virtual visit.    Physical Exam   healthy, alert and no distress  PSYCH: Alert and oriented times 3; coherent speech, normal   rate and volume, able to articulate logical thoughts, able   to abstract reason, no tangential thoughts, no hallucinations   or delusions  Her affect is normal  RESP: No cough, no audible wheezing, able to talk in full sentences  Remainder of exam unable to be completed due to telephone visits                Phone call duration: 12 minutes

## 2022-01-18 NOTE — TELEPHONE ENCOUNTER
Triage call:     Patient calling with suspected COVID.  Family members have tested positive for COVID 1/4   Her symptoms started 1/5. She reports her symptoms are improving but she still has a cough and headache.   She has not had a COVID test but has been quarantining since her symptoms started.   She has not been seen for a provider for her symptoms. Discussed virtual appointment. Transferred to scheduling.     Teagan Michele RN   01/18/22 3:57 PM  [facility] Nurse Advisor    Reason for Disposition    HIGH RISK for severe COVID complications (e.g., age > 64 years, obesity with BMI > 25, pregnant, chronic lung disease or other chronic medical condition)  (Exception: Already seen by PCP and no new or worsening symptoms.)    Additional Information    Negative: SEVERE difficulty breathing (e.g., struggling for each breath, speaks in single words)    Negative: Difficult to awaken or acting confused (e.g., disoriented, slurred speech)    Negative: Bluish (or gray) lips or face now    Negative: Shock suspected (e.g., cold/pale/clammy skin, too weak to stand, low BP, rapid pulse)    Negative: Sounds like a life-threatening emergency to the triager    Negative: SEVERE or constant chest pain or pressure (Exception: mild central chest pain, present only when coughing)    Negative: MODERATE difficulty breathing (e.g., speaks in phrases, SOB even at rest, pulse 100-120)    Negative: [1] Headache AND [2] stiff neck (can't touch chin to chest)    Negative: Fever > 103 F (39.4 C)    Negative: [1] Fever > 101 F (38.3 C) AND [2] age > 60 years    Negative: [1] Fever > 100.0 F (37.8 C) AND [2] bedridden (e.g., nursing home patient, CVA, chronic illness, recovering from surgery)    Negative: MILD difficulty breathing (e.g., minimal/no SOB at rest, SOB with walking, pulse <100)    Negative: Chest pain or pressure    Negative: Patient sounds very sick or weak to the triager    Protocols used: CORONAVIRUS (COVID-19) DIAGNOSED OR  [id number] 8.25.2021

## 2022-02-24 NOTE — PATIENT INSTRUCTIONS
Here is the plan from today's visit    1. Hypertension, Recent stroke  - You are doing a GREAT job with your medications  - Referral to see a pharmacist in one week  - Come back to see me in follow-up in 2 weeks  - Behavioral health folks will call to check in       Please call or return to clinic if your symptoms don't go away.    Follow up plan  - Pharmacist appointment in one week  - Follow-up with me in 2 weeks for blood pressure check and to talk about medications    Thank you for coming to [facility] today.  Lab Testing:  **If you had lab testing today and your results are reassuring or normal they will be mailed to you or sent through [employer] within 7 days.   **If the lab tests need quick action we will call you with the results.  The phone number we will call with results is # [phone] (home) . If this is not the best number please call our clinic and change the number.  Medication Refills:  If you need any refills please call your pharmacy and they will contact us.   If you need to  your refill at a new pharmacy, please contact the new pharmacy directly. The new pharmacy will help you get your medications transferred faster.   Scheduling:  If you have any concerns about today's visit or wish to schedule another appointment please call our office during normal business hours [phone] (8-5:00 M-F)  If a referral was made to a [facility] and you don't get a call from central scheduling please call [phone].  If a Mammogram was ordered for you at [facility] call [phone] to schedule or change your appointment.  If you had an XRay/CT/Ultrasound/MRI ordered the number is [phone] to schedule or change your radiology appointment.   Medical Concerns:  If you have urgent medical concerns please call [phone] at any time of the day.    Alma Rosa Peace MD    
no

## 2023-09-07 LAB
APTT PPP: 29 SECONDS (ref 22–38)
INR PPP: 1.09 (ref 0.85–1.15)

## 2023-09-07 PROCEDURE — 93010 ELECTROCARDIOGRAM REPORT: CPT | Performed by: EMERGENCY MEDICINE

## 2023-09-07 PROCEDURE — 93005 ELECTROCARDIOGRAM TRACING: CPT | Performed by: EMERGENCY MEDICINE

## 2023-09-07 PROCEDURE — 36415 COLL VENOUS BLD VENIPUNCTURE: CPT | Performed by: EMERGENCY MEDICINE

## 2023-09-07 PROCEDURE — 99285 EMERGENCY DEPT VISIT HI MDM: CPT | Mod: 25 | Performed by: EMERGENCY MEDICINE

## 2023-09-07 PROCEDURE — 85027 COMPLETE CBC AUTOMATED: CPT | Performed by: EMERGENCY MEDICINE

## 2023-09-07 PROCEDURE — 83735 ASSAY OF MAGNESIUM: CPT | Performed by: EMERGENCY MEDICINE

## 2023-09-07 PROCEDURE — 83880 ASSAY OF NATRIURETIC PEPTIDE: CPT | Performed by: EMERGENCY MEDICINE

## 2023-09-07 PROCEDURE — 84439 ASSAY OF FREE THYROXINE: CPT | Performed by: EMERGENCY MEDICINE

## 2023-09-07 PROCEDURE — 85610 PROTHROMBIN TIME: CPT | Performed by: EMERGENCY MEDICINE

## 2023-09-07 PROCEDURE — 96375 TX/PRO/DX INJ NEW DRUG ADDON: CPT | Performed by: EMERGENCY MEDICINE

## 2023-09-07 PROCEDURE — 96374 THER/PROPH/DIAG INJ IV PUSH: CPT | Mod: 59 | Performed by: EMERGENCY MEDICINE

## 2023-09-07 PROCEDURE — 85007 BL SMEAR W/DIFF WBC COUNT: CPT | Performed by: EMERGENCY MEDICINE

## 2023-09-07 PROCEDURE — 84484 ASSAY OF TROPONIN QUANT: CPT | Performed by: EMERGENCY MEDICINE

## 2023-09-07 PROCEDURE — 85730 THROMBOPLASTIN TIME PARTIAL: CPT | Performed by: EMERGENCY MEDICINE

## 2023-09-07 PROCEDURE — 80053 COMPREHEN METABOLIC PANEL: CPT | Performed by: EMERGENCY MEDICINE

## 2023-09-07 PROCEDURE — 84443 ASSAY THYROID STIM HORMONE: CPT | Performed by: EMERGENCY MEDICINE

## 2023-09-07 PROCEDURE — 83690 ASSAY OF LIPASE: CPT | Performed by: EMERGENCY MEDICINE

## 2023-09-07 RX ORDER — MORPHINE SULFATE 4 MG/ML
4 INJECTION, SOLUTION INTRAMUSCULAR; INTRAVENOUS ONCE
Status: COMPLETED | OUTPATIENT
Start: 2023-09-07 | End: 2023-09-08

## 2023-09-07 RX ORDER — ACETAMINOPHEN 325 MG/1
975 TABLET ORAL ONCE
Status: COMPLETED | OUTPATIENT
Start: 2023-09-07 | End: 2023-09-08

## 2023-09-07 RX ORDER — KETOROLAC TROMETHAMINE 15 MG/ML
10 INJECTION, SOLUTION INTRAMUSCULAR; INTRAVENOUS ONCE
Status: COMPLETED | OUTPATIENT
Start: 2023-09-07 | End: 2023-09-08

## 2023-09-08 ENCOUNTER — HOSPITAL ENCOUNTER (EMERGENCY)
Facility: CLINIC | Age: 78
Discharge: HOME | End: 2023-09-08
Attending: EMERGENCY MEDICINE | Admitting: EMERGENCY MEDICINE
Payer: COMMERCIAL

## 2023-09-08 ENCOUNTER — APPOINTMENT (OUTPATIENT)
Dept: CT IMAGING | Facility: CLINIC | Age: 78
End: 2023-09-08
Attending: EMERGENCY MEDICINE
Payer: COMMERCIAL

## 2023-09-08 VITALS
HEIGHT: 65 IN | BODY MASS INDEX: 39.65 KG/M2 | HEART RATE: 95 BPM | DIASTOLIC BLOOD PRESSURE: 95 MMHG | OXYGEN SATURATION: 98 % | TEMPERATURE: 97.9 F | SYSTOLIC BLOOD PRESSURE: 195 MMHG | RESPIRATION RATE: 17 BRPM | WEIGHT: 238 LBS

## 2023-09-08 DIAGNOSIS — M54.2 NECK PAIN ON RIGHT SIDE: ICD-10-CM

## 2023-09-08 DIAGNOSIS — R07.9 CHEST PAIN, UNSPECIFIED TYPE: ICD-10-CM

## 2023-09-08 LAB
ALBUMIN SERPL BCG-MCNC: 4 G/DL (ref 3.5–5.2)
ALP SERPL-CCNC: 85 U/L (ref 35–104)
ALT SERPL W P-5'-P-CCNC: <5 U/L (ref 0–50)
ANION GAP SERPL CALCULATED.3IONS-SCNC: 13 MMOL/L (ref 7–15)
AST SERPL W P-5'-P-CCNC: 12 U/L (ref 0–45)
ATRIAL RATE - MUSE: 84 BPM
BASOPHILS # BLD MANUAL: 0.1 10E3/UL (ref 0–0.2)
BASOPHILS NFR BLD MANUAL: 2 %
BILIRUB SERPL-MCNC: 0.3 MG/DL
BUN SERPL-MCNC: 11 MG/DL (ref 8–23)
CALCIUM SERPL-MCNC: 8.8 MG/DL (ref 8.8–10.2)
CHLORIDE SERPL-SCNC: 107 MMOL/L (ref 98–107)
CREAT SERPL-MCNC: 0.66 MG/DL (ref 0.51–0.95)
DEPRECATED HCO3 PLAS-SCNC: 21 MMOL/L (ref 22–29)
DIASTOLIC BLOOD PRESSURE - MUSE: NORMAL MMHG
EGFRCR SERPLBLD CKD-EPI 2021: 90 ML/MIN/1.73M2
EOSINOPHIL # BLD MANUAL: 0.1 10E3/UL (ref 0–0.7)
EOSINOPHIL NFR BLD MANUAL: 1 %
ERYTHROCYTE [DISTWIDTH] IN BLOOD BY AUTOMATED COUNT: 15.5 % (ref 10–15)
GLUCOSE SERPL-MCNC: 118 MG/DL (ref 70–99)
HCT VFR BLD AUTO: 37.3 % (ref 35–47)
HGB BLD-MCNC: 11.8 G/DL (ref 11.7–15.7)
HOLD SPECIMEN: NORMAL
INTERPRETATION ECG - MUSE: NORMAL
LIPASE SERPL-CCNC: 12 U/L (ref 13–60)
LYMPHOCYTES # BLD MANUAL: 1.3 10E3/UL (ref 0.8–5.3)
LYMPHOCYTES NFR BLD MANUAL: 19 %
MAGNESIUM SERPL-MCNC: 2 MG/DL (ref 1.7–2.3)
MCH RBC QN AUTO: 27.4 PG (ref 26.5–33)
MCHC RBC AUTO-ENTMCNC: 31.6 G/DL (ref 31.5–36.5)
MCV RBC AUTO: 87 FL (ref 78–100)
MONOCYTES # BLD MANUAL: 0.3 10E3/UL (ref 0–1.3)
MONOCYTES NFR BLD MANUAL: 5 %
NEUTROPHILS # BLD MANUAL: 5 10E3/UL (ref 1.6–8.3)
NEUTROPHILS NFR BLD MANUAL: 73 %
NT-PROBNP SERPL-MCNC: 100 PG/ML (ref 0–1800)
P AXIS - MUSE: 33 DEGREES
PLAT MORPH BLD: NORMAL
PLATELET # BLD AUTO: 108 10E3/UL (ref 150–450)
POTASSIUM SERPL-SCNC: 3.6 MMOL/L (ref 3.4–5.3)
PR INTERVAL - MUSE: 142 MS
PROT SERPL-MCNC: 6.9 G/DL (ref 6.4–8.3)
QRS DURATION - MUSE: 84 MS
QT - MUSE: 396 MS
QTC - MUSE: 467 MS
R AXIS - MUSE: -10 DEGREES
RBC # BLD AUTO: 4.3 10E6/UL (ref 3.8–5.2)
RBC MORPH BLD: NORMAL
SODIUM SERPL-SCNC: 141 MMOL/L (ref 136–145)
SYSTOLIC BLOOD PRESSURE - MUSE: NORMAL MMHG
T AXIS - MUSE: 30 DEGREES
T4 FREE SERPL-MCNC: 1.07 NG/DL (ref 0.9–1.7)
TROPONIN T SERPL HS-MCNC: 10 NG/L
TSH SERPL DL<=0.005 MIU/L-ACNC: 5.05 UIU/ML (ref 0.3–4.2)
VENTRICULAR RATE- MUSE: 84 BPM
WBC # BLD AUTO: 6.8 10E3/UL (ref 4–11)

## 2023-09-08 PROCEDURE — 250N000011 HC RX IP 250 OP 636: Performed by: EMERGENCY MEDICINE

## 2023-09-08 PROCEDURE — 70496 CT ANGIOGRAPHY HEAD: CPT

## 2023-09-08 PROCEDURE — 71250 CT THORAX DX C-: CPT

## 2023-09-08 PROCEDURE — 70498 CT ANGIOGRAPHY NECK: CPT

## 2023-09-08 PROCEDURE — 70450 CT HEAD/BRAIN W/O DYE: CPT

## 2023-09-08 PROCEDURE — 71250 CT THORAX DX C-: CPT | Mod: 26 | Performed by: RADIOLOGY

## 2023-09-08 PROCEDURE — 70498 CT ANGIOGRAPHY NECK: CPT | Mod: 26 | Performed by: RADIOLOGY

## 2023-09-08 PROCEDURE — 70496 CT ANGIOGRAPHY HEAD: CPT | Mod: 26 | Performed by: RADIOLOGY

## 2023-09-08 PROCEDURE — 250N000013 HC RX MED GY IP 250 OP 250 PS 637: Performed by: EMERGENCY MEDICINE

## 2023-09-08 PROCEDURE — 250N000011 HC RX IP 250 OP 636: Mod: JZ | Performed by: EMERGENCY MEDICINE

## 2023-09-08 PROCEDURE — 250N000009 HC RX 250: Performed by: EMERGENCY MEDICINE

## 2023-09-08 RX ORDER — ACETAMINOPHEN 500 MG
500-1000 TABLET ORAL EVERY 6 HOURS PRN
Qty: 30 TABLET | Refills: 0 | Status: SHIPPED | OUTPATIENT
Start: 2023-09-08

## 2023-09-08 RX ORDER — NAPROXEN 250 MG/1
250 TABLET ORAL ONCE
Status: COMPLETED | OUTPATIENT
Start: 2023-09-08 | End: 2023-09-08

## 2023-09-08 RX ORDER — IBUPROFEN 600 MG/1
600 TABLET, FILM COATED ORAL EVERY 6 HOURS PRN
Qty: 30 TABLET | Refills: 0 | Status: SHIPPED | OUTPATIENT
Start: 2023-09-08

## 2023-09-08 RX ORDER — CYCLOBENZAPRINE HCL 10 MG
10 TABLET ORAL 3 TIMES DAILY PRN
Qty: 15 TABLET | Refills: 0 | Status: SHIPPED | OUTPATIENT
Start: 2023-09-08 | End: 2023-09-23

## 2023-09-08 RX ORDER — CYCLOBENZAPRINE HCL 5 MG
5 TABLET ORAL ONCE
Status: COMPLETED | OUTPATIENT
Start: 2023-09-08 | End: 2023-09-08

## 2023-09-08 RX ORDER — IOPAMIDOL 755 MG/ML
67 INJECTION, SOLUTION INTRAVASCULAR ONCE
Status: COMPLETED | OUTPATIENT
Start: 2023-09-08 | End: 2023-09-08

## 2023-09-08 RX ADMIN — NAPROXEN 250 MG: 250 TABLET ORAL at 02:26

## 2023-09-08 RX ADMIN — KETOROLAC TROMETHAMINE 10 MG: 15 INJECTION, SOLUTION INTRAMUSCULAR; INTRAVENOUS at 00:03

## 2023-09-08 RX ADMIN — IOPAMIDOL 67 ML: 755 INJECTION, SOLUTION INTRAVENOUS at 00:50

## 2023-09-08 RX ADMIN — CYCLOBENZAPRINE HYDROCHLORIDE 5 MG: 5 TABLET, FILM COATED ORAL at 00:55

## 2023-09-08 RX ADMIN — MORPHINE SULFATE 4 MG: 4 INJECTION INTRAVENOUS at 00:03

## 2023-09-08 RX ADMIN — ACETAMINOPHEN 975 MG: 325 TABLET, FILM COATED ORAL at 00:02

## 2023-09-08 RX ADMIN — SODIUM CHLORIDE, PRESERVATIVE FREE 90 ML: 5 INJECTION INTRAVENOUS at 00:50

## 2023-09-08 ASSESSMENT — ACTIVITIES OF DAILY LIVING (ADL)
ADLS_ACUITY_SCORE: 35
ADLS_ACUITY_SCORE: 35

## 2023-09-08 NOTE — ED PROVIDER NOTES
"78-year-old female received in signout from prior attending.  Briefly presenting with right-sided severe neck pain.  Had a broad work-up including imaging to evaluate for dissection of the carotids, as well as CT head which have been unrevealing and reassuring.  Otherwise work-up negative.  Received analgesics as well as Flexeril with significant improvement.  Monitor in the emergency department and on reevaluation has a normal upper extremity neurological examination.    On further review  also notes that she has been sleeping excessively for some time both during the day as well as at night.  They will see a primary doctor for this.     BP (!) 198/98   Pulse 95   Temp 98.6  F (37  C)   Resp 16   Ht 1.651 m (5' 5\")   Wt 108 kg (238 lb)   SpO2 97%   BMI 39.61 kg/m      - Patient agrees to our plan and is ready and eager for discharge. Care plan, follow up plan, and reasons to return immediately to the ED were dicussed in detail and summarized as noted in the discharge instructions.       Kurt Garza MD  09/08/23 0325    "

## 2023-09-08 NOTE — ED TRIAGE NOTES
Patient arrives via EMS coming from home with CC of upper back pain x 1 day. Patient states the pain stated with CP yesterday and then went to right side then radiated to the back/ neck area.  Denies SOB, dizziness. Patient rates pain 10/10 . Patient states she hasn't been taking blood pressure medications due to cost.    Hx of stroke

## 2023-09-08 NOTE — ED PROVIDER NOTES
ED Provider Note  [facility]      History     Chief Complaint   Patient presents with    Neck Pain    Back Pain     HPI  Shira Marsh is a 78 year old female who presents with severe right-sided neck pain, which began in the right side of her chest while she was sitting doing nothing yesterday. pain is concentrated. feels like her neck is stiff and tilted to the left. no acute changes in vision, focal weakness or focal numbness, no difficulty breathing, fevers, or chills. I have reviewed the discharge notes from hospital medicine in 2020 in epic. pressures admitted for hypertensive emergency with suspected urinary infection and a potential stroke in her brainstem, which was not seen on MRI. with findings on physical exam of intraventricular ophthalmoplegia, patient was directed to be on dual antiplatelets for 3 weeks and multiple hypertension medications. today patient reports that since her medications are too expensive, she has not been taking any of her medications.  This part of the medical record was transcribed by Luz Zaragoza Medical Scribe, from a dictation done by Christ Ly MD.     Past Medical History  Past Medical History:   Diagnosis Date    CVA (cerebral vascular accident) (H) 07/12/2020    Degenerative disc disease     Depressive disorder     Enlarged heart     Hypertension     Left renal artery stenosis (H) 07/23/2020    Osteoarthritis of knees, bilateral      Past Surgical History:   Procedure Laterality Date    APPENDECTOMY      GYN SURGERY      Hysterectomy    GYN SURGERY      Tubal ligation     amLODIPine (NORVASC) 10 MG tablet  aspirin (ASA) 81 MG chewable tablet  atorvastatin (LIPITOR) 40 MG tablet  capsaicin (ZOSTRIX) 0.025 % external cream  losartan (COZAAR) 50 MG tablet      Allergies   Allergen Reactions    Lisinopril Cough    Talwin [Pentazocine Lactate]      Hallucinations     Family History  Family History   Problem Relation Age of Onset    Stomach  "Cancer Father     Hypertension Mother      Social History   Social History     Tobacco Use    Smoking status: Never    Smokeless tobacco: Never   Substance Use Topics    Alcohol use: No    Drug use: No      Past medical history, past surgical history, medications, allergies, family history, and social history were reviewed with the patient. No additional pertinent items.      A medically appropriate review of systems was performed with pertinent positives and negatives noted in the HPI, and all other systems negative.    Physical Exam   BP: (!) 198/98  Pulse: 95  Temp: 98.6  F (37  C)  Resp: 16  Height: 165.1 cm (5' 5\")  Weight: 108 kg (238 lb)  SpO2: 97 %  Physical Exam  Appears in pain tilting head and neck displaced to the left  Breathing comfortably, lungs clear to auscultation bilaterally  Heart regular rate and rhythm  Pupils equal and reactive  Sensation and strength normal bilaterally on face upper and lower extremities    ED Course, Procedures, & Data      Procedures            EKG Interpretation:      Interpreted by Christ Ly MD  Time reviewed: 2320  Symptoms at time of EKG: neck pain, back pain   Rhythm: normal sinus   Rate: 84 BPM  Axis: normal  Ectopy: none  Conduction: QTc 467  ST Segments/ T Waves: No ST-T wave changes  Q Waves: none  Comparison to prior: morphology similar to July 12, 2020 other than the left axis deviation in 2020     Clinical Impression: normal EKG              Results for orders placed or performed during the hospital encounter of 09/07/23   [place] Draw     Status: None (In process)    Narrative    The following orders were created for panel order [place] Draw.  Procedure                               Abnormality         Status                     ---------                               -----------         ------                     Extra Blue Top Tube[[id number]]                              In process                 Extra Red Top Tube[[id number]]                            "    In process                 Extra Green Top (Lithium...[[id number]]                      In process                 Extra Purple Top Tube[[id number]]                            In process                   Please view results for these tests on the individual orders.   EKG 12-lead, tracing only     Status: None (Preliminary result)   Result Value Ref Range    Systolic Blood Pressure  mmHg    Diastolic Blood Pressure  mmHg    Ventricular Rate 84 BPM    Atrial Rate 84 BPM    OR Interval 142 ms    QRS Duration 84 ms     ms    QTc 467 ms    P Axis 33 degrees    R AXIS -10 degrees    T Axis 30 degrees    Interpretation ECG       Sinus rhythm  Moderate voltage criteria for LVH, may be normal variant  Borderline ECG     CBC with platelets differential     Status: None (In process)    Narrative    The following orders were created for panel order CBC with platelets differential.  Procedure                               Abnormality         Status                     ---------                               -----------         ------                     CBC with platelets and d...[[id number]]                      In process                   Please view results for these tests on the individual orders.     Medications   ketorolac (TORADOL) injection 10 mg (has no administration in time range)   morphine (PF) injection 4 mg (has no administration in time range)   acetaminophen (TYLENOL) tablet 975 mg (has no administration in time range)     Labs Ordered and Resulted from Time of ED Arrival to Time of ED Departure - No data to display  CTA Head Neck with Contrast    (Results Pending)   CT Chest w/o Contrast    (Results Pending)   CT Head w/o Contrast    (Results Pending)          Critical care was not performed.     Medical Decision Making  The patient's presentation was of high complexity (a chronic illness severe exacerbation, progression, or side effect of treatment).    The patient's evaluation involved:  ordering  and/or review of 3+ test(s) in this encounter (see separate area of note for details)    The patient's management necessitated high risk (a parenteral controlled substance) and high risk (a decision regarding hospitalization).    Assessment & Plan    differential of severe right-sided neck pain with stiffness may indicate a muscular spasm, but given her history will rule out vessel dissection in her carotids, as well as intracranially with CT, CT angio head and neck. with chest pain which started yesterday will rule out acute coronary syndrome, pneumothorax. or pneumonia. labs with troponin, EKG, chest x-ray. pain medications IV given, we will entertain oral muscle relaxants as necessary and reassess.    1am: Feeling improved after initial IV pain medication.  Neck is more supple able to twist with some right-sided discomfort still.  Patient has returned from CT. we will give 1 dose of low-dose Flexeril and oral NSAIDs.  Endorsed to overnight attending pending CTA    This part of the medical record was transcribed by Prabhjot Ritter, from a dictation done by Christ Ly MD.     I have reviewed the nursing notes. I have reviewed the findings, diagnosis, plan and need for follow up with the patient.    New Prescriptions    No medications on file       Final diagnoses:   Neck pain on right side   Chest pain, unspecified type   I, Luz Zaragoza, am serving as a trained medical scribe to document services personally performed by Christ Ly MD based on the provider's statements to me on September 7, 2023.  This document has been checked and approved by the attending provider.    I, Christ Ly MD, was physically present and have reviewed and verified the accuracy of this note documented by prabhjot Ritteribaddi.      Christ Ly MD  [facility] EMERGENCY DEPARTMENT  9/7/2023     Christ Ly MD  09/08/23 0034       Christ Ly MD  09/08/23 0052

## 2024-11-21 ENCOUNTER — HOSPITAL ENCOUNTER (EMERGENCY)
Facility: CLINIC | Age: 79
Discharge: HOME | End: 2024-11-21
Attending: EMERGENCY MEDICINE
Payer: COMMERCIAL

## 2024-11-21 VITALS
BODY MASS INDEX: 39.61 KG/M2 | TEMPERATURE: 98 F | OXYGEN SATURATION: 99 % | HEART RATE: 89 BPM | DIASTOLIC BLOOD PRESSURE: 88 MMHG | RESPIRATION RATE: 20 BRPM | SYSTOLIC BLOOD PRESSURE: 174 MMHG | HEIGHT: 65 IN

## 2024-11-21 DIAGNOSIS — I16.0 HYPERTENSIVE URGENCY: ICD-10-CM

## 2024-11-21 LAB
ALBUMIN SERPL BCG-MCNC: 3.9 G/DL (ref 3.5–5.2)
ALBUMIN UR-MCNC: NEGATIVE MG/DL
ALP SERPL-CCNC: 80 U/L (ref 40–150)
ALT SERPL W P-5'-P-CCNC: 6 U/L (ref 0–50)
ANION GAP SERPL CALCULATED.3IONS-SCNC: 11 MMOL/L (ref 7–15)
APPEARANCE UR: CLEAR
AST SERPL W P-5'-P-CCNC: 15 U/L (ref 0–45)
ATRIAL RATE - MUSE: 80 BPM
BASOPHILS # BLD AUTO: 0 10E3/UL (ref 0–0.2)
BASOPHILS NFR BLD AUTO: 0 %
BILIRUB SERPL-MCNC: 0.4 MG/DL
BILIRUB UR QL STRIP: NEGATIVE
BUN SERPL-MCNC: 11.6 MG/DL (ref 8–23)
CALCIUM SERPL-MCNC: 8.9 MG/DL (ref 8.8–10.4)
CHLORIDE SERPL-SCNC: 109 MMOL/L (ref 98–107)
COLOR UR AUTO: ABNORMAL
CREAT SERPL-MCNC: 0.69 MG/DL (ref 0.51–0.95)
DIASTOLIC BLOOD PRESSURE - MUSE: NORMAL MMHG
EGFRCR SERPLBLD CKD-EPI 2021: 89 ML/MIN/1.73M2
EOSINOPHIL # BLD AUTO: 0 10E3/UL (ref 0–0.7)
EOSINOPHIL NFR BLD AUTO: 1 %
ERYTHROCYTE [DISTWIDTH] IN BLOOD BY AUTOMATED COUNT: 15.4 % (ref 10–15)
GLUCOSE SERPL-MCNC: 84 MG/DL (ref 70–99)
GLUCOSE UR STRIP-MCNC: NEGATIVE MG/DL
HCO3 SERPL-SCNC: 24 MMOL/L (ref 22–29)
HCT VFR BLD AUTO: 35 % (ref 35–47)
HGB BLD-MCNC: 10.7 G/DL (ref 11.7–15.7)
HGB UR QL STRIP: NEGATIVE
HOLD SPECIMEN: NORMAL
IMM GRANULOCYTES # BLD: 0 10E3/UL
IMM GRANULOCYTES NFR BLD: 0 %
INTERPRETATION ECG - MUSE: NORMAL
KETONES UR STRIP-MCNC: 20 MG/DL
LEUKOCYTE ESTERASE UR QL STRIP: NEGATIVE
LYMPHOCYTES # BLD AUTO: 1.7 10E3/UL (ref 0.8–5.3)
LYMPHOCYTES NFR BLD AUTO: 37 %
MCH RBC QN AUTO: 26.7 PG (ref 26.5–33)
MCHC RBC AUTO-ENTMCNC: 30.6 G/DL (ref 31.5–36.5)
MCV RBC AUTO: 87 FL (ref 78–100)
MONOCYTES # BLD AUTO: 0.4 10E3/UL (ref 0–1.3)
MONOCYTES NFR BLD AUTO: 9 %
NEUTROPHILS # BLD AUTO: 2.5 10E3/UL (ref 1.6–8.3)
NEUTROPHILS NFR BLD AUTO: 53 %
NITRATE UR QL: NEGATIVE
NRBC # BLD AUTO: 0 10E3/UL
NRBC BLD AUTO-RTO: 0 /100
P AXIS - MUSE: 31 DEGREES
PH UR STRIP: 7.5 [PH] (ref 5–7)
PLAT MORPH BLD: ABNORMAL
PLATELET # BLD AUTO: 111 10E3/UL (ref 150–450)
POLYCHROMASIA BLD QL SMEAR: SLIGHT
POTASSIUM SERPL-SCNC: 3.7 MMOL/L (ref 3.4–5.3)
PR INTERVAL - MUSE: 156 MS
PROT SERPL-MCNC: 6.7 G/DL (ref 6.4–8.3)
QRS DURATION - MUSE: 82 MS
QT - MUSE: 406 MS
QTC - MUSE: 468 MS
R AXIS - MUSE: -27 DEGREES
RBC # BLD AUTO: 4.01 10E6/UL (ref 3.8–5.2)
RBC MORPH BLD: ABNORMAL
RBC URINE: 0 /HPF
SODIUM SERPL-SCNC: 144 MMOL/L (ref 135–145)
SP GR UR STRIP: 1.01 (ref 1–1.03)
SQUAMOUS EPITHELIAL: 1 /HPF
SYSTOLIC BLOOD PRESSURE - MUSE: NORMAL MMHG
T AXIS - MUSE: 5 DEGREES
TROPONIN T SERPL HS-MCNC: 11 NG/L
TROPONIN T SERPL HS-MCNC: 11 NG/L
UROBILINOGEN UR STRIP-MCNC: NORMAL MG/DL
VENTRICULAR RATE- MUSE: 80 BPM
WBC # BLD AUTO: 4.7 10E3/UL (ref 4–11)
WBC URINE: <1 /HPF

## 2024-11-21 PROCEDURE — 99285 EMERGENCY DEPT VISIT HI MDM: CPT | Mod: 25 | Performed by: EMERGENCY MEDICINE

## 2024-11-21 PROCEDURE — 85018 HEMOGLOBIN: CPT | Performed by: EMERGENCY MEDICINE

## 2024-11-21 PROCEDURE — 250N000013 HC RX MED GY IP 250 OP 250 PS 637: Performed by: EMERGENCY MEDICINE

## 2024-11-21 PROCEDURE — 93010 ELECTROCARDIOGRAM REPORT: CPT | Performed by: EMERGENCY MEDICINE

## 2024-11-21 PROCEDURE — 85004 AUTOMATED DIFF WBC COUNT: CPT | Performed by: EMERGENCY MEDICINE

## 2024-11-21 PROCEDURE — 84484 ASSAY OF TROPONIN QUANT: CPT | Performed by: EMERGENCY MEDICINE

## 2024-11-21 PROCEDURE — 36415 COLL VENOUS BLD VENIPUNCTURE: CPT | Performed by: EMERGENCY MEDICINE

## 2024-11-21 PROCEDURE — 80053 COMPREHEN METABOLIC PANEL: CPT | Performed by: EMERGENCY MEDICINE

## 2024-11-21 PROCEDURE — 99284 EMERGENCY DEPT VISIT MOD MDM: CPT | Performed by: EMERGENCY MEDICINE

## 2024-11-21 PROCEDURE — 85048 AUTOMATED LEUKOCYTE COUNT: CPT | Performed by: EMERGENCY MEDICINE

## 2024-11-21 PROCEDURE — 93005 ELECTROCARDIOGRAM TRACING: CPT | Performed by: EMERGENCY MEDICINE

## 2024-11-21 PROCEDURE — 81001 URINALYSIS AUTO W/SCOPE: CPT | Performed by: EMERGENCY MEDICINE

## 2024-11-21 PROCEDURE — 250N000011 HC RX IP 250 OP 636: Performed by: EMERGENCY MEDICINE

## 2024-11-21 RX ORDER — ACETAMINOPHEN 500 MG
1000 TABLET ORAL ONCE
Status: COMPLETED | OUTPATIENT
Start: 2024-11-21 | End: 2024-11-21

## 2024-11-21 RX ORDER — LABETALOL HYDROCHLORIDE 5 MG/ML
10 INJECTION, SOLUTION INTRAVENOUS ONCE
Status: COMPLETED | OUTPATIENT
Start: 2024-11-21 | End: 2024-11-21

## 2024-11-21 RX ORDER — AMLODIPINE BESYLATE 5 MG/1
5 TABLET ORAL DAILY
Qty: 14 TABLET | Refills: 0 | Status: SHIPPED | OUTPATIENT
Start: 2024-11-21 | End: 2024-12-05

## 2024-11-21 RX ORDER — AMLODIPINE BESYLATE 5 MG/1
5 TABLET ORAL ONCE
Status: COMPLETED | OUTPATIENT
Start: 2024-11-21 | End: 2024-11-21

## 2024-11-21 RX ORDER — IBUPROFEN 200 MG
400 TABLET ORAL ONCE
Status: COMPLETED | OUTPATIENT
Start: 2024-11-21 | End: 2024-11-21

## 2024-11-21 RX ADMIN — LABETALOL HYDROCHLORIDE 10 MG: 5 INJECTION, SOLUTION INTRAVENOUS at 11:06

## 2024-11-21 RX ADMIN — ACETAMINOPHEN 1000 MG: 500 TABLET ORAL at 16:25

## 2024-11-21 RX ADMIN — IBUPROFEN 400 MG: 200 TABLET, FILM COATED ORAL at 16:25

## 2024-11-21 RX ADMIN — AMLODIPINE BESYLATE 5 MG: 5 TABLET ORAL at 12:31

## 2024-11-21 ASSESSMENT — ACTIVITIES OF DAILY LIVING (ADL)
ADLS_ACUITY_SCORE: 0

## 2024-11-21 NOTE — ED PROVIDER NOTES
[place] EMERGENCY DEPARTMENT ([facility])    11/21/24       ED PROVIDER NOTE   ED 15   History     Chief Complaint   Patient presents with    Generalized Weakness    Dizziness     The history is provided by the patient and medical records.     Shira Marsh is a 79 year old female with prior history of hypertension, distant history of hypertensive emergency, CVA 7/12/2020, left ventricular hypertrophy (echocardiogram 7/12/2020), Left renal artery stenosis who presents to the emergency department with generalized weakness, lightheadedness. 911 was called, on scene she was noted to have an unsteady gait. Vital signs were normal with /84, satting 97% on A, blood sugar of 85. She was brought here for further evaluation. Patient reports dizziness since 5:00 am this morning. She states it feels as if she is going to fall when she stands up. Dizziness present when both standing and laying down and is accompanied with nausea. The dizziness and general bilateral lower extremity weakness has caused difficulty standing. In addition, she reports right-sided shoulder, neck, and head pain that has been present for the past month. Her family reports patient is on blood pressure medication but has not been taking the medication. Her family also notes a history of stroke.     Social history: patient lives with  at home.    Epic records reviewed, patient does have a prior history of CVA workup in July 2020.   Head CT without contrast, 7/12/2020   Impression:  Subcentimeter roundish hyperdensity in the right frontal cortical  region. Could be an intra-axial versus an extra-axial lesion. If it is  an intra-axial lesion could be a small focus of hemorrhage versus a  vascular malformation (cavernous malformation). MRI with contrast is  recommended to further evaluate.      [Critical Result: Subcentimeter hyperdensity at the right anterior  frontal area, could be an hemorrhage.]    MRI brain without and with  contrast   MRA of the head without contrast  Neck MRA without and with contrast  7/12/20   Impression:  1. No acute infarction.  2. Changes of moderately advanced chronic small vessel ischemic  disease.  3. Scattered foci of susceptibility in the cerebral hemispheres, basal  ganglia, midbrain, david and cerebellum, likely representing chronic  microhemorrhages.  4. 8 mm heterogeneously enhancing right frontal dural based irregular  lesion. This is indeterminate. After evaluation of noncontrast CT  appearance, arterial (CTA) and venous enhancement (MR postgd)  patterns, and basic MR signal characteristics, the findings are not  very typical of a meningioma. A dural based metastasis in differential  though, there is no malignancy history in clinical records. Short  interval follow up can be considered in couple weeks to follow up this  finding.   5. Head MRA demonstrates no definite aneurysm or stenosis of the major  intracranial arteries.  5. Neck MRA demonstrates patent major cervical arteries.    Past Medical History  Past Medical History:   Diagnosis Date    CVA (cerebral vascular accident) (H) 07/12/2020    Degenerative disc disease     Depressive disorder     Enlarged heart     Hypertension     Left renal artery stenosis (H) 07/23/2020    Osteoarthritis of knees, bilateral      Past Surgical History:   Procedure Laterality Date    APPENDECTOMY      GYN SURGERY      Hysterectomy    GYN SURGERY      Tubal ligation     acetaminophen (TYLENOL) 500 MG tablet  amLODIPine (NORVASC) 10 MG tablet  aspirin (ASA) 81 MG chewable tablet  atorvastatin (LIPITOR) 40 MG tablet  capsaicin (ZOSTRIX) 0.025 % external cream  ibuprofen (ADVIL/MOTRIN) 600 MG tablet  losartan (COZAAR) 50 MG tablet      Allergies   Allergen Reactions    Lisinopril Cough    Talwin [Pentazocine Lactate]      Hallucinations     Family History  Family History   Problem Relation Age of Onset    Stomach Cancer Father     Hypertension Mother      Social History  "  Social History     Tobacco Use    Smoking status: Never    Smokeless tobacco: Never   Substance Use Topics    Alcohol use: No    Drug use: No      A complete review of systems was performed with pertinent positives and negatives noted in the HPI, and all other systems negative.    Physical Exam      Physical Exam  ***    ED Course, Procedures, & Data      Procedures       {ED Course Selections (Optional):018685}  {ED Sepsis CMS Documentation (Optional):174610::\" \"}       No results found for any visits on 11/21/24.  Medications - No data to display  Labs Ordered and Resulted from Time of ED Arrival to Time of ED Departure - No data to display  No orders to display          {Critical Care Performed?:555868}    Assessment & Plan    ***    I have reviewed the nursing notes. I have reviewed the findings, diagnosis, plan and need for follow up with the patient.    New Prescriptions    No medications on file       Final diagnoses:   None   Roxana SAINZ, am serving as a trained medical scribe to document services personally performed by Latanya Zee MD, based on the provider's statements to me.     Latanya SAINZ MD, was physically present and have reviewed and verified the accuracy of this note documented by Roxana Davis.     Latanya Zee MD   [facility] EMERGENCY DEPARTMENT  11/21/2024  " life or bodily function).    The patient's evaluation involved:  ordering and/or review of 3+ test(s) in this encounter (see separate area of note for details)    The patient's management necessitated moderate risk (prescription drug management including medications given in the ED).    Assessment & Plan      79 year old female with prior history of hypertension, distant history of hypertensive emergency, CVA 7/12/2020, left ventricular hypertrophy (echocardiogram 7/12/2020), Left renal artery stenosis who presents to the emergency department with generalized weakness, lightheadedness.  Vital signs on initial presentation notable for blood pressure elevation to 191/84 but otherwise afebrile and stable including normal pulse ox at 100% on room air.  EKG obtained which revealed normal sinus rhythm without acute ischemic changes.  IV established, labs sent which revealed normal CBC electrolytes, normal troponin which is flat around 11 x 2.  Patient was given labetalol 10 mg IV with improvement in her blood pressure, she was given amlodipine 5 mg p.o. subsequently.  Upon repeat evaluation patient's blood pressure remained stable and her symptoms improved.  She is given ibuprofen and acetaminophen along with refills for her amlodipine and plan to follow-up with primary care provider for further evaluation care.    I have reviewed the nursing notes. I have reviewed the findings, diagnosis, plan and need for follow up with the patient.    New Prescriptions    No medications on file       Final diagnoses:   Hypertensive urgency   IRoxana, am serving as a trained medical scribe to document services personally performed by Latanya Zee MD, based on the provider's statements to me.     ILatanya MD, was physically present and have reviewed and verified the accuracy of this note documented by Roxana Davis.     Latanya Zee MD   [facility] EMERGENCY DEPARTMENT  11/21/2024     Yayo,  Latayna GRAY MD  11/23/24 2955

## 2024-11-21 NOTE — ED TRIAGE NOTES
BIBA for weakness, lightheadness. Hx stroke. /84. 97% on RA. BG 85. Unsteady gait. Live with  in home.         Abdomen soft, non-tender, no guarding.

## 2024-11-21 NOTE — ED NOTES
Bed: ED15  Expected date:   Expected time:   Means of arrival:   Comments:  Hems 438 79f weakness hypertensive

## 2024-11-21 NOTE — DISCHARGE INSTRUCTIONS
Please make an appointment to follow up with Primary Care - [facility] (phone: [phone]) in 10-14 days.

## (undated) RX ORDER — LIDOCAINE HYDROCHLORIDE 10 MG/ML
INJECTION, SOLUTION EPIDURAL; INFILTRATION; INTRACAUDAL; PERINEURAL
Status: DISPENSED
Start: 2020-07-15